# Patient Record
Sex: FEMALE | Race: BLACK OR AFRICAN AMERICAN | Employment: UNEMPLOYED | URBAN - METROPOLITAN AREA
[De-identification: names, ages, dates, MRNs, and addresses within clinical notes are randomized per-mention and may not be internally consistent; named-entity substitution may affect disease eponyms.]

---

## 2019-06-17 ENCOUNTER — HOSPITAL ENCOUNTER (EMERGENCY)
Age: 39
Discharge: HOME | End: 2019-06-17
Attending: EMERGENCY MEDICINE
Payer: COMMERCIAL

## 2019-06-17 VITALS
HEIGHT: 62 IN | OXYGEN SATURATION: 98 % | SYSTOLIC BLOOD PRESSURE: 140 MMHG | WEIGHT: 187.13 LBS | TEMPERATURE: 99.1 F | RESPIRATION RATE: 16 BRPM | BODY MASS INDEX: 34.44 KG/M2 | DIASTOLIC BLOOD PRESSURE: 70 MMHG | HEART RATE: 88 BPM

## 2019-06-17 DIAGNOSIS — L02.219 SUPRAPUBIC ABSCESS: Primary | ICD-10-CM

## 2019-06-17 PROCEDURE — 99282 EMERGENCY DEPT VISIT SF MDM: CPT

## 2019-06-17 PROCEDURE — 6370000000 HC RX 637 (ALT 250 FOR IP): Performed by: EMERGENCY MEDICINE

## 2019-06-17 PROCEDURE — 4500000022 HC ED LEVEL 2 PROCEDURE

## 2019-06-17 RX ORDER — HYDROCODONE BITARTRATE AND ACETAMINOPHEN 5; 325 MG/1; MG/1
1 TABLET ORAL EVERY 6 HOURS PRN
Qty: 6 TABLET | Refills: 0 | Status: SHIPPED | OUTPATIENT
Start: 2019-06-17 | End: 2019-06-20

## 2019-06-17 RX ORDER — CEPHALEXIN 500 MG/1
500 CAPSULE ORAL ONCE
Status: COMPLETED | OUTPATIENT
Start: 2019-06-17 | End: 2019-06-17

## 2019-06-17 RX ORDER — SULFAMETHOXAZOLE AND TRIMETHOPRIM 800; 160 MG/1; MG/1
1 TABLET ORAL ONCE
Status: COMPLETED | OUTPATIENT
Start: 2019-06-17 | End: 2019-06-17

## 2019-06-17 RX ORDER — CEPHALEXIN 500 MG/1
500 CAPSULE ORAL 4 TIMES DAILY
Qty: 28 CAPSULE | Refills: 0 | Status: SHIPPED | OUTPATIENT
Start: 2019-06-17 | End: 2019-06-24

## 2019-06-17 RX ORDER — IBUPROFEN 400 MG/1
800 TABLET ORAL ONCE
Status: COMPLETED | OUTPATIENT
Start: 2019-06-17 | End: 2019-06-17

## 2019-06-17 RX ORDER — NAPROXEN 500 MG/1
500 TABLET ORAL 2 TIMES DAILY PRN
Qty: 20 TABLET | Refills: 0 | Status: SHIPPED | OUTPATIENT
Start: 2019-06-17 | End: 2021-06-10

## 2019-06-17 RX ORDER — SULFAMETHOXAZOLE AND TRIMETHOPRIM 800; 160 MG/1; MG/1
1 TABLET ORAL 2 TIMES DAILY
Qty: 14 TABLET | Refills: 0 | Status: SHIPPED | OUTPATIENT
Start: 2019-06-17 | End: 2019-06-24

## 2019-06-17 RX ADMIN — IBUPROFEN 800 MG: 400 TABLET ORAL at 21:27

## 2019-06-17 RX ADMIN — SULFAMETHOXAZOLE AND TRIMETHOPRIM 1 TABLET: 800; 160 TABLET ORAL at 21:27

## 2019-06-17 RX ADMIN — CEPHALEXIN 500 MG: 500 CAPSULE ORAL at 21:27

## 2019-06-17 ASSESSMENT — PAIN DESCRIPTION - ORIENTATION
ORIENTATION: LOWER;LEFT
ORIENTATION: LEFT

## 2019-06-17 ASSESSMENT — PAIN DESCRIPTION - DESCRIPTORS
DESCRIPTORS: TENDER
DESCRIPTORS: ACHING

## 2019-06-17 ASSESSMENT — PAIN DESCRIPTION - LOCATION
LOCATION: ABDOMEN
LOCATION: ABDOMEN

## 2019-06-17 ASSESSMENT — PAIN DESCRIPTION - FREQUENCY: FREQUENCY: CONTINUOUS

## 2019-06-17 ASSESSMENT — PAIN SCALES - GENERAL
PAINLEVEL_OUTOF10: 10
PAINLEVEL_OUTOF10: 7
PAINLEVEL_OUTOF10: 10

## 2019-06-17 ASSESSMENT — PAIN DESCRIPTION - PAIN TYPE: TYPE: ACUTE PAIN

## 2019-06-18 NOTE — DISCHARGE INSTR - COC
Continuity of Care Form    Patient Name: Jessie Rider   :  1980  MRN:  [id number]    Admit date:  2019  Discharge date:  ***    Code Status Order: No Order   Advance Directives:     Admitting Physician:  No admitting provider for patient encounter. PCP: No primary care provider on file. Discharging Nurse: [facility] Unit/Room#:   Discharging Unit Phone Number: ***    Emergency Contact:   Extended Emergency Contact Information  Primary Emergency Contact: Mika Agarwal [facility] Phone: [phone]  Relation: Parent  Secondary Emergency Contact: [address] [place] [address] Phone: [phone]  Relation: Other    Past Surgical History:  History reviewed. No pertinent surgical history. Immunization History: There is no immunization history on file for this patient. Active Problems: There is no problem list on file for this patient.       Isolation/Infection:   Isolation          No Isolation            Nurse Assessment:  Last Vital Signs: /70   Pulse 104   Temp 99.1 °F (37.3 °C)   Resp 16   Ht 5' 2\" (1.575 m)   Wt 84.9 kg (187 lb 2 oz)   LMP 2019   SpO2 98%   BMI 34.23 kg/m²     Last documented pain score (0-10 scale): Pain Level: 10  Last Weight:   Wt Readings from Last 1 Encounters:   19 84.9 kg (187 lb 2 oz)     Mental Status:  {IP PT MENTAL STATUS:86380}    IV Access:  { VERNELL IV ACCESS:921095118}    Nursing Mobility/ADLs:  Walking   {P DME RMJQ:034778962}  Transfer  {P DME DDJX:282197434}  Bathing  {CHP DME LQFA:343530725}  Dressing  {CHP DME MBPD:203433497}  Toileting  {P DME LEHJ:492444513}  Feeding  {P DME HSFA:828027636}  Med Admin  {P DME PUYE:774279246}  Med Delivery   { VERNELL MED Delivery:721287106}    Wound Care Documentation and Therapy:        Elimination:  Continence:   · Bowel: {YES / EU:29479}  · Bladder: {YES / GT:52778}  Urinary Catheter: {Urinary Catheter:307014024}   Colostomy/Ileostomy/Ileal Conduit: {YES for the continuing treatment of the diagnosis listed and that she requires {Admit to Appropriate Level of Care:45312} for {GREATER/LESS:738208613} 30 days.      Update Admission H&P: {CHP DME Changes in NJIYO:917682266}    PHYSICIAN SIGNATURE:  {Esignature:077727889}

## 2019-06-18 NOTE — ED PROVIDER NOTES
partner violence:     Fear of current or ex partner: Not on file     Emotionally abused: Not on file     Physically abused: Not on file     Forced sexual activity: Not on file   Other Topics Concern    Not on file   Social History Narrative    Not on file     No current facility-administered medications for this encounter. Current Outpatient Medications   Medication Sig Dispense Refill    sulfamethoxazole-trimethoprim (BACTRIM DS) 800-160 MG per tablet Take 1 tablet by mouth 2 times daily for 7 days 14 tablet 0    cephALEXin (KEFLEX) 500 MG capsule Take 1 capsule by mouth 4 times daily for 7 days 28 capsule 0    naproxen (NAPROSYN) 500 MG tablet Take 1 tablet by mouth 2 times daily as needed for Pain 20 tablet 0    HYDROcodone-acetaminophen (NORCO) 5-325 MG per tablet Take 1 tablet by mouth every 6 hours as needed for Pain for up to 3 days. Intended supply: 3 days. Take lowest dose possible to manage pain 6 tablet 0    ibuprofen (ADVIL;MOTRIN) 800 MG tablet Take 1 tablet by mouth every 8 hours as needed for Pain 30 tablet 0     No Known Allergies    REVIEW OF SYSTEMS  10 systems reviewed, pertinent positives per HPI otherwise noted to be negative. PHYSICAL EXAM  BP (!) 140/70   Pulse 88   Temp 99.1 °F (37.3 °C)   Resp 16   Ht 5' 2\" (1.575 m)   Wt 84.9 kg (187 lb 2 oz)   LMP 05/31/2019   SpO2 98%   BMI 34.23 kg/m²   GENERAL APPEARANCE: Awake and alert. Cooperative. No acute distress  HEAD: Normocephalic. Atraumatic. EYES: PERRL. EOM's grossly intact. ENT: Mucous membranes are moist.   NECK: Supple. HEART: RRR. No murmurs  LUNGS: Respirations nonlabored. Lungs are clear to auscultation bilaterally. ABDOMEN: Soft. Non-distended. Tenderness over the skin of the suprapubic region, with erythema surrounding, no streaking redness, isolated to approximately 4 cm x 4 cm area. No guarding or rebound. Normal bowel sounds. EXTREMITIES: No peripheral edema. Moves all extremities equally.  All extremities neurovascularly intact. SKIN: Warm and dry. No acute rashes. Superior suprapubic just left of midline with induration, central fluctuance, and significant tenderness to palpation, with guarding, no open wounds or drainage, patient is obese, had to lift her pannus up to visualize  NEUROLOGICAL: Alert and oriented. No gross facial drooping. Strength 5/5, sensation intact. Normal speech  PSYCHIATRIC: Normal mood and affect. Anxious      PROCEDURES  Patient Name: Raven Sandoval   Medical Record Number: [id number]  Room/Bed: 06/06  Incision and Drainage Procedure Note  Indication: Abscess-suprapubic region just inferior to pannus cleft    Procedure: The patient was positioned appropriately and the skin over the incision site was prepped with chlorhexidine. Local anesthesia was obtained by infiltration using 0.5% Bupivacaine without epinephrine. An incision was then made over the apex of the lesion, over area visualized under ultrasound and approximately 3 cc of foul smelling, purulent and bloody material was expressed. Loculations were broken up using a hemostat but no more material was returned. The drainage cavity was then irrigated, packed with sterile gauze and dressed with nonadherent pad, ABD pad, and tape. The patient tolerated the procedure well. Complications: None    Bedside Ultrasound Soft Tissue Exam:    Indication: Abscess/cellulitis of suprapubic abdominal wall    Cobblestoning: positive   Abscess: positive  Foreign body: negative    Impression: Small approximately 3 cm x 3 cm abscess, with surrounding cellulitic changes of 1 to 2 cm. Images obtained and read by Dr. Samantha Parker      ED COURSE/MDM  Patient seen and evaluated. Old records reviewed. Labs and imaging reviewed and results discussed with patient.    68-year-old female with superficial abscess of suprapubic region, incision and drainage performed with purulent drainage noted, bleeding controlled, packing applied and told to have this removed in 2 days, I explained she needed to keep the area clean and dry, given dose of antibiotics here and prescriptions for home, ibuprofen here for pain, but she requested something stronger for home, I gave her a few Norco along with the Naprosyn, after reviewing her oarrs report, she is afebrile, with no evidence of systemic process, I do not suspect intra-abdominal lesion/infection, otherwise benign abdominal exam, strict return precautions given, will return if any worsening symptoms or new concerns, patient verbalized understanding of plan, felt comfortable going home. Orders Placed This Encounter   Medications    ibuprofen (ADVIL;MOTRIN) tablet 800 mg    cephALEXin (KEFLEX) capsule 500 mg    sulfamethoxazole-trimethoprim (BACTRIM DS;SEPTRA DS) 800-160 MG per tablet 1 tablet    sulfamethoxazole-trimethoprim (BACTRIM DS) 800-160 MG per tablet     Sig: Take 1 tablet by mouth 2 times daily for 7 days     Dispense:  14 tablet     Refill:  0    cephALEXin (KEFLEX) 500 MG capsule     Sig: Take 1 capsule by mouth 4 times daily for 7 days     Dispense:  28 capsule     Refill:  0    naproxen (NAPROSYN) 500 MG tablet     Sig: Take 1 tablet by mouth 2 times daily as needed for Pain     Dispense:  20 tablet     Refill:  0    HYDROcodone-acetaminophen (NORCO) 5-325 MG per tablet     Sig: Take 1 tablet by mouth every 6 hours as needed for Pain for up to 3 days. Intended supply: 3 days. Take lowest dose possible to manage pain     Dispense:  6 tablet     Refill:  0          Patient was given scripts for the following medications. I counseled patient how to take these medications.    Discharge Medication List as of 6/17/2019 10:12 PM      START taking these medications    Details   sulfamethoxazole-trimethoprim (BACTRIM DS) 800-160 MG per tablet Take 1 tablet by mouth 2 times daily for 7 days, Disp-14 tablet, R-0Print      cephALEXin (KEFLEX) 500 MG capsule Take 1 capsule by mouth 4 times daily for 7 days, Disp-28 capsule, R-0Print      naproxen (NAPROSYN) 500 MG tablet Take 1 tablet by mouth 2 times daily as needed for Pain, Disp-20 tablet, R-0Print      HYDROcodone-acetaminophen (NORCO) 5-325 MG per tablet Take 1 tablet by mouth every 6 hours as needed for Pain for up to 3 days. Intended supply: 3 days. Take lowest dose possible to manage pain, Disp-6 tablet, R-0Print             CLINICAL IMPRESSION  1. Suprapubic abscess        Blood pressure (!) 140/70, pulse 88, temperature 99.1 °F (37.3 °C), resp. rate 16, height 5' 2\" (1.575 m), weight 84.9 kg (187 lb 2 oz), last menstrual period 05/31/2019, SpO2 98 %, not currently breastfeeding. DISPOSITION  Martene Bamberger was discharged to home in stable condition.             Karis Desir, DO  06/18/19 0002

## 2019-08-18 ENCOUNTER — HOSPITAL ENCOUNTER (EMERGENCY)
Age: 39
Discharge: HOME | End: 2019-08-18
Attending: EMERGENCY MEDICINE
Payer: COMMERCIAL

## 2019-08-18 VITALS
DIASTOLIC BLOOD PRESSURE: 80 MMHG | RESPIRATION RATE: 17 BRPM | OXYGEN SATURATION: 100 % | HEART RATE: 94 BPM | SYSTOLIC BLOOD PRESSURE: 136 MMHG | TEMPERATURE: 98.3 F

## 2019-08-18 DIAGNOSIS — B07.0 PLANTAR WART: ICD-10-CM

## 2019-08-18 DIAGNOSIS — R10.32 ABDOMINAL PAIN, LEFT LOWER QUADRANT: ICD-10-CM

## 2019-08-18 DIAGNOSIS — N73.0 PID (ACUTE PELVIC INFLAMMATORY DISEASE): Primary | ICD-10-CM

## 2019-08-18 DIAGNOSIS — N89.8 VAGINAL DISCHARGE: ICD-10-CM

## 2019-08-18 LAB
BACTERIA WET PREP: NORMAL
BACTERIA: ABNORMAL /HPF
BILIRUBIN URINE: NEGATIVE
BLOOD, URINE: ABNORMAL
CLARITY: ABNORMAL
CLUE CELLS: NORMAL
COLOR: YELLOW
EPITHELIAL CELLS WET PREP: NORMAL
EPITHELIAL CELLS, UA: ABNORMAL /HPF
GLUCOSE URINE: NEGATIVE MG/DL
HCG(URINE) PREGNANCY TEST: NEGATIVE
KETONES, URINE: NEGATIVE MG/DL
LEUKOCYTE ESTERASE, URINE: NEGATIVE
MICROSCOPIC EXAMINATION: YES
MUCUS: ABNORMAL /LPF
NITRITE, URINE: NEGATIVE
PH UA: 6 (ref 5–8)
PROTEIN UA: ABNORMAL MG/DL
RBC UA: ABNORMAL /HPF (ref 0–2)
RBC WET PREP: NORMAL
SOURCE WET PREP: NORMAL
SPECIFIC GRAVITY UA: >=1.03 (ref 1–1.03)
TRICHOMONAS PREP: NORMAL
URINE TYPE: ABNORMAL
UROBILINOGEN, URINE: 1 E.U./DL
WBC UA: ABNORMAL /HPF (ref 0–5)
WBC WET PREP: NORMAL
YEAST WET PREP: NORMAL

## 2019-08-18 PROCEDURE — 81001 URINALYSIS AUTO W/SCOPE: CPT

## 2019-08-18 PROCEDURE — 87210 SMEAR WET MOUNT SALINE/INK: CPT

## 2019-08-18 PROCEDURE — 96372 THER/PROPH/DIAG INJ SC/IM: CPT

## 2019-08-18 PROCEDURE — 99283 EMERGENCY DEPT VISIT LOW MDM: CPT

## 2019-08-18 PROCEDURE — 87491 CHLMYD TRACH DNA AMP PROBE: CPT

## 2019-08-18 PROCEDURE — 87591 N.GONORRHOEAE DNA AMP PROB: CPT

## 2019-08-18 PROCEDURE — 6360000002 HC RX W HCPCS: Performed by: EMERGENCY MEDICINE

## 2019-08-18 PROCEDURE — 84703 CHORIONIC GONADOTROPIN ASSAY: CPT

## 2019-08-18 PROCEDURE — 6370000000 HC RX 637 (ALT 250 FOR IP): Performed by: EMERGENCY MEDICINE

## 2019-08-18 RX ORDER — IBUPROFEN 600 MG/1
600 TABLET ORAL EVERY 6 HOURS PRN
Qty: 30 TABLET | Refills: 0 | Status: SHIPPED | OUTPATIENT
Start: 2019-08-18 | End: 2021-06-10

## 2019-08-18 RX ORDER — DOXYCYCLINE 100 MG/1
100 CAPSULE ORAL ONCE
Status: COMPLETED | OUTPATIENT
Start: 2019-08-18 | End: 2019-08-18

## 2019-08-18 RX ORDER — DOXYCYCLINE 100 MG/1
100 TABLET ORAL 2 TIMES DAILY
Qty: 28 TABLET | Refills: 0 | Status: SHIPPED | OUTPATIENT
Start: 2019-08-18 | End: 2019-09-01

## 2019-08-18 RX ORDER — CEFTRIAXONE 1 G/1
1 INJECTION, POWDER, FOR SOLUTION INTRAMUSCULAR; INTRAVENOUS ONCE
Status: COMPLETED | OUTPATIENT
Start: 2019-08-18 | End: 2019-08-18

## 2019-08-18 RX ORDER — CRYOTHERAPY WART REMOVE DEVICE
EACH TOPICAL
Qty: 1 KIT | Refills: 0 | Status: SHIPPED | OUTPATIENT
Start: 2019-08-18 | End: 2021-06-10

## 2019-08-18 RX ADMIN — CEFTRIAXONE 1 G: 1 INJECTION, POWDER, FOR SOLUTION INTRAMUSCULAR; INTRAVENOUS at 20:24

## 2019-08-18 RX ADMIN — DOXYCYCLINE 100 MG: 100 CAPSULE ORAL at 20:24

## 2019-08-18 ASSESSMENT — PAIN DESCRIPTION - LOCATION: LOCATION: ABDOMEN

## 2019-08-18 ASSESSMENT — PAIN DESCRIPTION - ORIENTATION: ORIENTATION: LEFT

## 2019-08-18 ASSESSMENT — PAIN SCALES - GENERAL: PAINLEVEL_OUTOF10: 7

## 2019-08-18 ASSESSMENT — PAIN DESCRIPTION - PAIN TYPE: TYPE: ACUTE PAIN

## 2019-08-21 LAB
C TRACH DNA GENITAL QL NAA+PROBE: NEGATIVE
N. GONORRHOEAE DNA: NEGATIVE

## 2019-10-13 ENCOUNTER — HOSPITAL ENCOUNTER (EMERGENCY)
Age: 39
Discharge: HOME | End: 2019-10-13
Attending: EMERGENCY MEDICINE
Payer: COMMERCIAL

## 2019-10-13 VITALS
RESPIRATION RATE: 16 BRPM | DIASTOLIC BLOOD PRESSURE: 65 MMHG | HEIGHT: 62 IN | BODY MASS INDEX: 34.8 KG/M2 | TEMPERATURE: 97.9 F | WEIGHT: 189.13 LBS | HEART RATE: 78 BPM | SYSTOLIC BLOOD PRESSURE: 112 MMHG | OXYGEN SATURATION: 99 %

## 2019-10-13 DIAGNOSIS — T25.221A PARTIAL THICKNESS BURN OF RIGHT FOOT, INITIAL ENCOUNTER: Primary | ICD-10-CM

## 2019-10-13 PROCEDURE — 90715 TDAP VACCINE 7 YRS/> IM: CPT | Performed by: EMERGENCY MEDICINE

## 2019-10-13 PROCEDURE — 4500000022 HC ED LEVEL 2 PROCEDURE

## 2019-10-13 PROCEDURE — 6360000002 HC RX W HCPCS: Performed by: EMERGENCY MEDICINE

## 2019-10-13 PROCEDURE — 99282 EMERGENCY DEPT VISIT SF MDM: CPT

## 2019-10-13 PROCEDURE — 90471 IMMUNIZATION ADMIN: CPT | Performed by: EMERGENCY MEDICINE

## 2019-10-13 PROCEDURE — 6370000000 HC RX 637 (ALT 250 FOR IP): Performed by: EMERGENCY MEDICINE

## 2019-10-13 RX ORDER — NAPROXEN 500 MG/1
500 TABLET ORAL 2 TIMES DAILY WITH MEALS
Qty: 30 TABLET | Refills: 0 | Status: SHIPPED | OUTPATIENT
Start: 2019-10-13 | End: 2021-06-10

## 2019-10-13 RX ORDER — NAPROXEN 500 MG/1
500 TABLET ORAL ONCE
Status: COMPLETED | OUTPATIENT
Start: 2019-10-13 | End: 2019-10-13

## 2019-10-13 RX ADMIN — Medication 500 MG: at 22:35

## 2019-10-13 RX ADMIN — TETANUS TOXOID, REDUCED DIPHTHERIA TOXOID AND ACELLULAR PERTUSSIS VACCINE, ADSORBED 0.5 ML: 5; 2.5; 8; 8; 2.5 SUSPENSION INTRAMUSCULAR at 22:54

## 2019-10-13 ASSESSMENT — PAIN DESCRIPTION - LOCATION: LOCATION: FOOT

## 2019-10-13 ASSESSMENT — PAIN SCALES - GENERAL: PAINLEVEL_OUTOF10: 10

## 2019-10-13 ASSESSMENT — PAIN DESCRIPTION - PAIN TYPE: TYPE: ACUTE PAIN

## 2019-10-13 ASSESSMENT — PAIN DESCRIPTION - DESCRIPTORS: DESCRIPTORS: BURNING

## 2019-10-13 ASSESSMENT — PAIN DESCRIPTION - ORIENTATION: ORIENTATION: RIGHT

## 2019-10-14 ASSESSMENT — ENCOUNTER SYMPTOMS: COLOR CHANGE: 1

## 2020-02-05 ENCOUNTER — HOSPITAL ENCOUNTER (EMERGENCY)
Age: 40
Discharge: HOME | End: 2020-02-05
Attending: EMERGENCY MEDICINE
Payer: COMMERCIAL

## 2020-02-05 VITALS
RESPIRATION RATE: 16 BRPM | OXYGEN SATURATION: 100 % | HEART RATE: 103 BPM | BODY MASS INDEX: 31.89 KG/M2 | HEIGHT: 63 IN | SYSTOLIC BLOOD PRESSURE: 126 MMHG | DIASTOLIC BLOOD PRESSURE: 85 MMHG | TEMPERATURE: 98.3 F | WEIGHT: 180 LBS

## 2020-02-05 PROCEDURE — 96372 THER/PROPH/DIAG INJ SC/IM: CPT

## 2020-02-05 PROCEDURE — 99282 EMERGENCY DEPT VISIT SF MDM: CPT

## 2020-02-05 PROCEDURE — 6360000002 HC RX W HCPCS: Performed by: EMERGENCY MEDICINE

## 2020-02-05 PROCEDURE — 6370000000 HC RX 637 (ALT 250 FOR IP): Performed by: EMERGENCY MEDICINE

## 2020-02-05 RX ORDER — AZITHROMYCIN 250 MG/1
1000 TABLET, FILM COATED ORAL ONCE
Status: COMPLETED | OUTPATIENT
Start: 2020-02-05 | End: 2020-02-05

## 2020-02-05 RX ORDER — CEFTRIAXONE SODIUM 250 MG/1
250 INJECTION, POWDER, FOR SOLUTION INTRAMUSCULAR; INTRAVENOUS ONCE
Status: COMPLETED | OUTPATIENT
Start: 2020-02-05 | End: 2020-02-05

## 2020-02-05 RX ADMIN — CEFTRIAXONE SODIUM 250 MG: 250 INJECTION, POWDER, FOR SOLUTION INTRAMUSCULAR; INTRAVENOUS at 06:16

## 2020-02-05 RX ADMIN — AZITHROMYCIN MONOHYDRATE 1000 MG: 250 TABLET ORAL at 06:16

## 2020-02-05 ASSESSMENT — PAIN DESCRIPTION - DESCRIPTORS: DESCRIPTORS: SHARP

## 2020-02-05 ASSESSMENT — PAIN SCALES - GENERAL
PAINLEVEL_OUTOF10: 5
PAINLEVEL_OUTOF10: 5

## 2020-02-05 ASSESSMENT — PAIN DESCRIPTION - FREQUENCY: FREQUENCY: CONTINUOUS

## 2020-02-05 ASSESSMENT — PAIN DESCRIPTION - ORIENTATION: ORIENTATION: LOWER

## 2020-02-05 ASSESSMENT — PAIN DESCRIPTION - LOCATION: LOCATION: ABDOMEN

## 2020-02-05 ASSESSMENT — PAIN DESCRIPTION - PROGRESSION: CLINICAL_PROGRESSION: GRADUALLY IMPROVING

## 2020-02-05 ASSESSMENT — PAIN DESCRIPTION - PAIN TYPE: TYPE: ACUTE PAIN

## 2020-02-05 ASSESSMENT — PAIN DESCRIPTION - ONSET: ONSET: SUDDEN

## 2020-02-05 NOTE — ED TRIAGE NOTES
States she had GC and chlamydia a month ago and was treated but did not wait the 7 days after treatment to have sex. No has abdominal pain and discharge and wants to be treated again. States she does not to repeat testing. Education regarding repeated STD's and not using condoms when not in a stable relationship.

## 2020-02-05 NOTE — ED PROVIDER NOTES
[address] [facility]  eMERGENCY dEPARTMENT eNCOUnter      Pt Name: Simon Mitchell  MRN: [id number]  Richietrongffreida 1980  Date of evaluation: 2/5/2020  Provider: Barrie Patel MD  PCP: No primary care provider on file. CHIEF COMPLAINT       Chief Complaint   Patient presents with    Exposure to STD       HISTORY OFPRESENT ILLNESS   (Location/Symptom, Timing/Onset, Context/Setting, Quality, Duration, Modifying Factors,Severity)  Note limiting factors. Simon Mitchell is a 42 y.o. female is concerned because she got treated for gonorrhea and chlamydia and her symptoms are back she does not want to get retested she just wants to be treated again she rates her pain at a 10 out of 10 is not associate with any vomiting or diarrhea she does have mild discharge    Nursing Notes were all reviewed and agreed with or any disagreements were addressed  in the HPI. REVIEW OF SYSTEMS    (2-9 systems for level 4, 10 or more for level 5)     Review of Systems    Positives and Pertinent negatives as per HPI. Except as noted above in the ROS, all other systems were reviewed andnegative. PASTMEDICAL HISTORY   History reviewed. No pertinent past medical history. SURGICAL HISTORY     History reviewed. No pertinent surgical history. CURRENT MEDICATIONS       Previous Medications    IBUPROFEN (ADVIL;MOTRIN) 600 MG TABLET    Take 1 tablet by mouth every 6 hours as needed for Pain    NAPROXEN (NAPROSYN) 500 MG TABLET    Take 1 tablet by mouth 2 times daily as needed for Pain    NAPROXEN (NAPROSYN) 500 MG TABLET    Take 1 tablet by mouth 2 times daily (with meals)    PROPANE-DIMETHYL ETHER (COMPOUND W FREEZE OFF PLANTAR) KIT    Use as directed on package to remove lesion/wart on your foot       ALLERGIES     Patient has no known allergies. FAMILY HISTORY     No family history on file.        SOCIAL HISTORY       Social History     Socioeconomic History    Marital status: Single rhythm, S1 and S2 normal, no murmur, rub or gallop. Abdomen:   Soft, non-tender, bowel sounds active,   no masses, no organomegaly. Extremities: No edema, cords or calf tenderness. Full range of motion. Pulses: 2+ and symmetric   Skin: Turgor is normal, no rashes or lesions. Neurologic: Alert and oriented X 3. No focal findings. Motor grossly normal.  Speech clear, no drift, CN III-XII grossly intact,        DIAGNOSTIC RESULTS   LABS:    Labs Reviewed - No data to display    All other labs were within normal range or not returned as of this dictation. EKG: All EKG's are interpreted by the Emergency Department Physician who eithersigns or Co-signs this chart in the absence of a cardiologist.        RADIOLOGY:   Non-plain film images such as CT, Ultrasound and MRI are read by the radiologist. Plain radiographic images are visualized by myself. *    Interpretation per the Radiologist below, if available at the time of this note:    No orders to display         PROCEDURES   Unless otherwise noted below, none     Procedures    *    CRITICAL CARE TIME   N/A      EMERGENCY DEPARTMENT COURSE and DIFFERENTIALDIAGNOSIS/MDM:   Vitals:    Vitals:    02/05/20 0554   BP: 126/85   Pulse: 103   Resp: 16   Temp: 98.3 °F (36.8 °C)   TempSrc: Oral   SpO2: 100%   Weight: 81.6 kg (180 lb)   Height: 5' 3\" (1.6 m)       Patient was given thefollowing medications:  Medications   cefTRIAXone (ROCEPHIN) injection 250 mg (has no administration in time range)   azithromycin (ZITHROMAX) tablet 1,000 mg (has no administration in time range)           The patient tolerated their visit well. The patient and / or the familywere informed of the results of any tests, a time was given to answer questions. FINAL IMPRESSION      1.  Cervicitis and endocervicitis          DISPOSITION/PLAN   DISPOSITION        PATIENT REFERRED TO:  *[facility]    In 1 week        DISCHARGE MEDICATIONS:  New Prescriptions    No medications

## 2020-03-30 ENCOUNTER — HOSPITAL ENCOUNTER (EMERGENCY)
Age: 40
Discharge: HOME | End: 2020-03-30
Attending: EMERGENCY MEDICINE
Payer: COMMERCIAL

## 2020-03-30 VITALS
SYSTOLIC BLOOD PRESSURE: 131 MMHG | WEIGHT: 179.7 LBS | RESPIRATION RATE: 15 BRPM | BODY MASS INDEX: 31.83 KG/M2 | TEMPERATURE: 98.7 F | DIASTOLIC BLOOD PRESSURE: 71 MMHG | OXYGEN SATURATION: 99 % | HEART RATE: 96 BPM

## 2020-03-30 LAB
AMORPHOUS: ABNORMAL /HPF
BACTERIA WET PREP: ABNORMAL
BACTERIA: ABNORMAL /HPF
BILIRUBIN URINE: NEGATIVE
BLOOD, URINE: NEGATIVE
CLARITY: CLEAR
CLUE CELLS: ABNORMAL
COLOR: YELLOW
EPITHELIAL CELLS WET PREP: ABNORMAL
EPITHELIAL CELLS, UA: ABNORMAL /HPF (ref 0–5)
GLUCOSE URINE: NEGATIVE MG/DL
HCG(URINE) PREGNANCY TEST: NEGATIVE
KETONES, URINE: NEGATIVE MG/DL
LEUKOCYTE ESTERASE, URINE: ABNORMAL
MICROSCOPIC EXAMINATION: YES
MUCUS: ABNORMAL /LPF
NITRITE, URINE: NEGATIVE
PH UA: 7 (ref 5–8)
PROTEIN UA: NEGATIVE MG/DL
RBC UA: ABNORMAL /HPF (ref 0–4)
RBC WET PREP: ABNORMAL
SOURCE WET PREP: ABNORMAL
SPECIFIC GRAVITY UA: 1.02 (ref 1–1.03)
TRICHOMONAS PREP: ABNORMAL
URINE TYPE: ABNORMAL
UROBILINOGEN, URINE: 0.2 E.U./DL
WBC UA: ABNORMAL /HPF (ref 0–5)
WBC WET PREP: ABNORMAL
YEAST WET PREP: ABNORMAL

## 2020-03-30 PROCEDURE — 87210 SMEAR WET MOUNT SALINE/INK: CPT

## 2020-03-30 PROCEDURE — 87591 N.GONORRHOEAE DNA AMP PROB: CPT

## 2020-03-30 PROCEDURE — 6370000000 HC RX 637 (ALT 250 FOR IP): Performed by: EMERGENCY MEDICINE

## 2020-03-30 PROCEDURE — 96372 THER/PROPH/DIAG INJ SC/IM: CPT

## 2020-03-30 PROCEDURE — 87491 CHLMYD TRACH DNA AMP PROBE: CPT

## 2020-03-30 PROCEDURE — 84703 CHORIONIC GONADOTROPIN ASSAY: CPT

## 2020-03-30 PROCEDURE — 6360000002 HC RX W HCPCS: Performed by: EMERGENCY MEDICINE

## 2020-03-30 PROCEDURE — 99283 EMERGENCY DEPT VISIT LOW MDM: CPT

## 2020-03-30 PROCEDURE — 81001 URINALYSIS AUTO W/SCOPE: CPT

## 2020-03-30 RX ORDER — METRONIDAZOLE 500 MG/1
500 TABLET ORAL 2 TIMES DAILY
Qty: 28 TABLET | Refills: 0 | Status: SHIPPED | OUTPATIENT
Start: 2020-03-30 | End: 2020-04-13

## 2020-03-30 RX ORDER — DOXYCYCLINE 100 MG/1
100 TABLET ORAL 2 TIMES DAILY
Qty: 28 TABLET | Refills: 0 | Status: SHIPPED | OUTPATIENT
Start: 2020-03-30 | End: 2020-04-13

## 2020-03-30 RX ORDER — METRONIDAZOLE 500 MG/1
500 TABLET ORAL ONCE
Status: COMPLETED | OUTPATIENT
Start: 2020-03-30 | End: 2020-03-30

## 2020-03-30 RX ORDER — DOXYCYCLINE 100 MG/1
100 CAPSULE ORAL ONCE
Status: COMPLETED | OUTPATIENT
Start: 2020-03-30 | End: 2020-03-30

## 2020-03-30 RX ORDER — CEFTRIAXONE SODIUM 250 MG/1
250 INJECTION, POWDER, FOR SOLUTION INTRAMUSCULAR; INTRAVENOUS ONCE
Status: COMPLETED | OUTPATIENT
Start: 2020-03-30 | End: 2020-03-30

## 2020-03-30 RX ADMIN — METRONIDAZOLE 500 MG: 500 TABLET ORAL at 14:17

## 2020-03-30 RX ADMIN — CEFTRIAXONE SODIUM 250 MG: 250 INJECTION, POWDER, FOR SOLUTION INTRAMUSCULAR; INTRAVENOUS at 14:17

## 2020-03-30 RX ADMIN — DOXYCYCLINE 100 MG: 100 CAPSULE ORAL at 14:17

## 2020-03-30 ASSESSMENT — PAIN DESCRIPTION - ORIENTATION: ORIENTATION: LOWER

## 2020-03-30 ASSESSMENT — PAIN DESCRIPTION - PAIN TYPE: TYPE: ACUTE PAIN

## 2020-03-30 ASSESSMENT — PAIN SCALES - GENERAL: PAINLEVEL_OUTOF10: 10

## 2020-03-30 ASSESSMENT — PAIN DESCRIPTION - LOCATION: LOCATION: ABDOMEN

## 2020-03-30 NOTE — ED PROVIDER NOTES
EMERGENCY DEPARTMENT PHYSICIAN DOCUMENTATION      CHIEF COMPLAINT  Vaginal Discharge  Patient information was obtained from patient. History/Exam limitations: none. HISTORY OF PRESENT ILLNESS  Rodolfo Davis is a 42 y.o. female with complaint of Vaginal discharge  Onset of discharge 2 weeks ago. Described as abnormal discharge, smells different  No urinary symptoms No radiation  Worse with skin rubbing and walking  No fevers or chills. +lower abdominal pain        REVIEW OF SYSTEMS  A full 10 point Review of Systems was performed and is negative aside from pertinent positives mentioned in HPI    ALLERGIES:  No Known Allergies    PAST HISTORY  History reviewed. No pertinent past medical history. History reviewed. No pertinent family history. No current facility-administered medications on file prior to encounter.       Current Outpatient Medications on File Prior to Encounter   Medication Sig Dispense Refill    naproxen (NAPROSYN) 500 MG tablet Take 1 tablet by mouth 2 times daily (with meals) 30 tablet 0    ibuprofen (ADVIL;MOTRIN) 600 MG tablet Take 1 tablet by mouth every 6 hours as needed for Pain 30 tablet 0    Propane-Dimethyl Ether (COMPOUND W FREEZE OFF PLANTAR) KIT Use as directed on package to remove lesion/wart on your foot 1 kit 0    naproxen (NAPROSYN) 500 MG tablet Take 1 tablet by mouth 2 times daily as needed for Pain 20 tablet 0       Social History     Tobacco Use    Smoking status: Current Every Day Smoker     Packs/day: 1.00     Types: Cigarettes    Smokeless tobacco: Never Used   Substance Use Topics    Alcohol use: Yes     Comment: occas    Drug use: No         EXAM:   Presentation Vital Signs: /71   Pulse 96   Temp 98.7 °F (37.1 °C) (Oral)   Resp 15   Wt 81.5 kg (179 lb 11.2 oz)   LMP 03/09/2020   SpO2 99%   BMI 31.83 kg/m²   General: Well nourished, no acute distress  Head: No traumatic injury  ENT: MMM, no facial asymmetry, no nasal discharge  Eyes:

## 2020-03-31 LAB
C TRACH DNA GENITAL QL NAA+PROBE: NEGATIVE
N. GONORRHOEAE DNA: NEGATIVE

## 2020-04-28 ENCOUNTER — HOSPITAL ENCOUNTER (EMERGENCY)
Age: 40
Discharge: HOME | End: 2020-04-28
Payer: COMMERCIAL

## 2020-04-28 VITALS
TEMPERATURE: 99.1 F | WEIGHT: 170.6 LBS | BODY MASS INDEX: 30.22 KG/M2 | RESPIRATION RATE: 16 BRPM | DIASTOLIC BLOOD PRESSURE: 92 MMHG | HEART RATE: 92 BPM | OXYGEN SATURATION: 100 % | SYSTOLIC BLOOD PRESSURE: 169 MMHG

## 2020-04-28 LAB
BACTERIA WET PREP: ABNORMAL
BACTERIA: ABNORMAL /HPF
BILIRUBIN URINE: NEGATIVE
BLOOD, URINE: NEGATIVE
CLARITY: CLEAR
CLUE CELLS: ABNORMAL
COLOR: YELLOW
EPITHELIAL CELLS WET PREP: ABNORMAL
EPITHELIAL CELLS, UA: ABNORMAL /HPF (ref 0–5)
GLUCOSE URINE: NEGATIVE MG/DL
HCG(URINE) PREGNANCY TEST: NEGATIVE
KETONES, URINE: ABNORMAL MG/DL
LEUKOCYTE ESTERASE, URINE: ABNORMAL
MICROSCOPIC EXAMINATION: YES
MUCUS: ABNORMAL /LPF
NITRITE, URINE: NEGATIVE
PH UA: 6 (ref 5–8)
PROTEIN UA: NEGATIVE MG/DL
RBC UA: ABNORMAL /HPF (ref 0–4)
RBC WET PREP: ABNORMAL
SOURCE WET PREP: ABNORMAL
SPECIFIC GRAVITY UA: 1.02 (ref 1–1.03)
TRICHOMONAS PREP: ABNORMAL
URINE REFLEX TO CULTURE: YES
URINE TYPE: ABNORMAL
UROBILINOGEN, URINE: 0.2 E.U./DL
WBC UA: ABNORMAL /HPF (ref 0–5)
WBC WET PREP: ABNORMAL
YEAST WET PREP: ABNORMAL

## 2020-04-28 PROCEDURE — 87210 SMEAR WET MOUNT SALINE/INK: CPT

## 2020-04-28 PROCEDURE — 84703 CHORIONIC GONADOTROPIN ASSAY: CPT

## 2020-04-28 PROCEDURE — 87086 URINE CULTURE/COLONY COUNT: CPT

## 2020-04-28 PROCEDURE — 87591 N.GONORRHOEAE DNA AMP PROB: CPT

## 2020-04-28 PROCEDURE — 99283 EMERGENCY DEPT VISIT LOW MDM: CPT

## 2020-04-28 PROCEDURE — 87491 CHLMYD TRACH DNA AMP PROBE: CPT

## 2020-04-28 PROCEDURE — 6370000000 HC RX 637 (ALT 250 FOR IP): Performed by: PHYSICIAN ASSISTANT

## 2020-04-28 PROCEDURE — 81001 URINALYSIS AUTO W/SCOPE: CPT

## 2020-04-28 RX ORDER — FLUCONAZOLE 150 MG/1
150 TABLET ORAL ONCE
Status: COMPLETED | OUTPATIENT
Start: 2020-04-28 | End: 2020-04-28

## 2020-04-28 RX ORDER — FLUCONAZOLE 150 MG/1
150 TABLET ORAL ONCE
Qty: 1 TABLET | Refills: 0 | Status: SHIPPED | OUTPATIENT
Start: 2020-05-01 | End: 2020-05-01

## 2020-04-28 RX ORDER — METRONIDAZOLE 7.5 MG/G
1 GEL VAGINAL DAILY
Qty: 1 TUBE | Refills: 0 | Status: SHIPPED | OUTPATIENT
Start: 2020-04-28 | End: 2020-05-03

## 2020-04-28 RX ADMIN — FLUCONAZOLE 150 MG: 150 TABLET ORAL at 18:27

## 2020-04-28 ASSESSMENT — PAIN DESCRIPTION - LOCATION: LOCATION: ABDOMEN

## 2020-04-28 ASSESSMENT — PAIN DESCRIPTION - PAIN TYPE: TYPE: ACUTE PAIN

## 2020-04-28 ASSESSMENT — PAIN DESCRIPTION - DESCRIPTORS: DESCRIPTORS: CRAMPING

## 2020-04-28 ASSESSMENT — PAIN SCALES - GENERAL: PAINLEVEL_OUTOF10: 4

## 2020-04-28 NOTE — ED NOTES
Pt states understanding of d/c instructions. Pt alert and oriented with steady gait upon discharge.       Anant Márquez RN  04/28/20 0189

## 2020-04-28 NOTE — ED TRIAGE NOTES
Pt c/o vaginal itching and burning. Mild lower abdominal cramping. Seen in ED last and treated for STDs. Patient states she missed some pills of her Doxycycline.

## 2020-04-29 LAB
C TRACH DNA GENITAL QL NAA+PROBE: NEGATIVE
N. GONORRHOEAE DNA: NEGATIVE
URINE CULTURE, ROUTINE: NORMAL

## 2021-06-10 ENCOUNTER — APPOINTMENT (OUTPATIENT)
Dept: GENERAL RADIOLOGY | Age: 41
End: 2021-06-10
Payer: COMMERCIAL

## 2021-06-10 ENCOUNTER — HOSPITAL ENCOUNTER (EMERGENCY)
Age: 41
Discharge: HOME | End: 2021-06-10
Attending: EMERGENCY MEDICINE
Payer: COMMERCIAL

## 2021-06-10 VITALS
RESPIRATION RATE: 16 BRPM | SYSTOLIC BLOOD PRESSURE: 144 MMHG | HEIGHT: 62 IN | TEMPERATURE: 98.9 F | WEIGHT: 169.75 LBS | DIASTOLIC BLOOD PRESSURE: 99 MMHG | OXYGEN SATURATION: 100 % | HEART RATE: 100 BPM | BODY MASS INDEX: 31.24 KG/M2

## 2021-06-10 DIAGNOSIS — R07.9 CHEST PAIN, UNSPECIFIED TYPE: Primary | ICD-10-CM

## 2021-06-10 LAB
A/G RATIO: 1.1 (ref 1.1–2.2)
ALBUMIN SERPL-MCNC: 4.4 G/DL (ref 3.4–5)
ALP BLD-CCNC: 78 U/L (ref 40–129)
ALT SERPL-CCNC: 13 U/L (ref 10–40)
ANION GAP SERPL CALCULATED.3IONS-SCNC: 12 MMOL/L (ref 3–16)
AST SERPL-CCNC: 18 U/L (ref 15–37)
BASOPHILS ABSOLUTE: 0 K/UL (ref 0–0.2)
BASOPHILS RELATIVE PERCENT: 0.4 %
BILIRUB SERPL-MCNC: 0.4 MG/DL (ref 0–1)
BUN BLDV-MCNC: 10 MG/DL (ref 7–20)
CALCIUM SERPL-MCNC: 9.5 MG/DL (ref 8.3–10.6)
CHLORIDE BLD-SCNC: 101 MMOL/L (ref 99–110)
CO2: 24 MMOL/L (ref 21–32)
CREAT SERPL-MCNC: 0.8 MG/DL (ref 0.6–1.1)
D DIMER: 213 NG/ML DDU (ref 0–229)
EOSINOPHILS ABSOLUTE: 0 K/UL (ref 0–0.6)
EOSINOPHILS RELATIVE PERCENT: 0 %
GFR AFRICAN AMERICAN: >60
GFR NON-AFRICAN AMERICAN: >60
GLOBULIN: 3.9 G/DL
GLUCOSE BLD-MCNC: 104 MG/DL (ref 70–99)
HCG QUALITATIVE: NEGATIVE
HCT VFR BLD CALC: 40.8 % (ref 36–48)
HEMOGLOBIN: 13.9 G/DL (ref 12–16)
LYMPHOCYTES ABSOLUTE: 1.7 K/UL (ref 1–5.1)
LYMPHOCYTES RELATIVE PERCENT: 21.3 %
MCH RBC QN AUTO: 29.4 PG (ref 26–34)
MCHC RBC AUTO-ENTMCNC: 34 G/DL (ref 31–36)
MCV RBC AUTO: 86.5 FL (ref 80–100)
MONOCYTES ABSOLUTE: 0.6 K/UL (ref 0–1.3)
MONOCYTES RELATIVE PERCENT: 7.2 %
NEUTROPHILS ABSOLUTE: 5.7 K/UL (ref 1.7–7.7)
NEUTROPHILS RELATIVE PERCENT: 71.1 %
PDW BLD-RTO: 13.8 % (ref 12.4–15.4)
PLATELET # BLD: 308 K/UL (ref 135–450)
PMV BLD AUTO: 8.2 FL (ref 5–10.5)
POTASSIUM SERPL-SCNC: 3.4 MMOL/L (ref 3.5–5.1)
RBC # BLD: 4.72 M/UL (ref 4–5.2)
REASON FOR REJECTION: NORMAL
REJECTED TEST: NORMAL
SARS-COV-2, NAAT: NOT DETECTED
SODIUM BLD-SCNC: 137 MMOL/L (ref 136–145)
TOTAL PROTEIN: 8.3 G/DL (ref 6.4–8.2)
TROPONIN: <0.01 NG/ML
TROPONIN: <0.01 NG/ML
WBC # BLD: 8 K/UL (ref 4–11)

## 2021-06-10 PROCEDURE — 85025 COMPLETE CBC W/AUTO DIFF WBC: CPT

## 2021-06-10 PROCEDURE — 84484 ASSAY OF TROPONIN QUANT: CPT

## 2021-06-10 PROCEDURE — 84703 CHORIONIC GONADOTROPIN ASSAY: CPT

## 2021-06-10 PROCEDURE — 96374 THER/PROPH/DIAG INJ IV PUSH: CPT

## 2021-06-10 PROCEDURE — 99285 EMERGENCY DEPT VISIT HI MDM: CPT

## 2021-06-10 PROCEDURE — 2580000003 HC RX 258: Performed by: EMERGENCY MEDICINE

## 2021-06-10 PROCEDURE — 85379 FIBRIN DEGRADATION QUANT: CPT

## 2021-06-10 PROCEDURE — 93005 ELECTROCARDIOGRAM TRACING: CPT | Performed by: EMERGENCY MEDICINE

## 2021-06-10 PROCEDURE — 36415 COLL VENOUS BLD VENIPUNCTURE: CPT

## 2021-06-10 PROCEDURE — 80053 COMPREHEN METABOLIC PANEL: CPT

## 2021-06-10 PROCEDURE — 6370000000 HC RX 637 (ALT 250 FOR IP): Performed by: EMERGENCY MEDICINE

## 2021-06-10 PROCEDURE — 6360000002 HC RX W HCPCS: Performed by: EMERGENCY MEDICINE

## 2021-06-10 PROCEDURE — 87635 SARS-COV-2 COVID-19 AMP PRB: CPT

## 2021-06-10 PROCEDURE — 71045 X-RAY EXAM CHEST 1 VIEW: CPT

## 2021-06-10 RX ORDER — 0.9 % SODIUM CHLORIDE 0.9 %
1000 INTRAVENOUS SOLUTION INTRAVENOUS ONCE
Status: COMPLETED | OUTPATIENT
Start: 2021-06-10 | End: 2021-06-10

## 2021-06-10 RX ORDER — 0.9 % SODIUM CHLORIDE 0.9 %
1000 INTRAVENOUS SOLUTION INTRAVENOUS ONCE
Status: DISCONTINUED | OUTPATIENT
Start: 2021-06-10 | End: 2021-06-10 | Stop reason: HOSPADM

## 2021-06-10 RX ORDER — LORAZEPAM 1 MG/1
1 TABLET ORAL ONCE
Status: COMPLETED | OUTPATIENT
Start: 2021-06-10 | End: 2021-06-10

## 2021-06-10 RX ORDER — ASPIRIN 81 MG/1
324 TABLET, CHEWABLE ORAL ONCE
Status: COMPLETED | OUTPATIENT
Start: 2021-06-10 | End: 2021-06-10

## 2021-06-10 RX ORDER — LORAZEPAM 2 MG/ML
1 INJECTION INTRAMUSCULAR ONCE
Status: COMPLETED | OUTPATIENT
Start: 2021-06-10 | End: 2021-06-10

## 2021-06-10 RX ORDER — LORAZEPAM 1 MG/1
1 TABLET ORAL ONCE
Status: DISCONTINUED | OUTPATIENT
Start: 2021-06-10 | End: 2021-06-10

## 2021-06-10 RX ADMIN — LORAZEPAM 1 MG: 2 INJECTION INTRAMUSCULAR; INTRAVENOUS at 19:20

## 2021-06-10 RX ADMIN — LORAZEPAM 1 MG: 1 TABLET ORAL at 16:09

## 2021-06-10 RX ADMIN — SODIUM CHLORIDE 1000 ML: 9 INJECTION, SOLUTION INTRAVENOUS at 17:32

## 2021-06-10 RX ADMIN — SODIUM CHLORIDE 1000 ML: 9 INJECTION, SOLUTION INTRAVENOUS at 19:19

## 2021-06-10 RX ADMIN — ASPIRIN 324 MG: 81 TABLET, CHEWABLE ORAL at 16:09

## 2021-06-10 ASSESSMENT — PAIN SCALES - GENERAL
PAINLEVEL_OUTOF10: 8
PAINLEVEL_OUTOF10: 0
PAINLEVEL_OUTOF10: 7

## 2021-06-10 ASSESSMENT — PAIN DESCRIPTION - LOCATION
LOCATION: BACK
LOCATION: FLANK

## 2021-06-10 ASSESSMENT — PAIN DESCRIPTION - ONSET: ONSET: ON-GOING

## 2021-06-10 ASSESSMENT — PAIN DESCRIPTION - ORIENTATION: ORIENTATION: LEFT

## 2021-06-10 ASSESSMENT — PAIN DESCRIPTION - PROGRESSION: CLINICAL_PROGRESSION: NOT CHANGED

## 2021-06-10 ASSESSMENT — PAIN DESCRIPTION - DESCRIPTORS
DESCRIPTORS: DISCOMFORT
DESCRIPTORS: ACHING

## 2021-06-10 ASSESSMENT — PAIN DESCRIPTION - PAIN TYPE
TYPE: CHRONIC PAIN
TYPE: CHRONIC PAIN

## 2021-06-10 ASSESSMENT — PAIN DESCRIPTION - FREQUENCY: FREQUENCY: INTERMITTENT

## 2021-06-10 ASSESSMENT — PAIN - FUNCTIONAL ASSESSMENT: PAIN_FUNCTIONAL_ASSESSMENT: ACTIVITIES ARE NOT PREVENTED

## 2021-06-10 NOTE — ED PROVIDER NOTES
Concern    Not on file   Social History Narrative    Not on file     Social Determinants of Health     Financial Resource Strain:     Difficulty of Paying Living Expenses:    Food Insecurity:     Worried About Running Out of Food in the Last Year:     [address] in the Last Year:    Transportation Needs:     Lack of Transportation (Medical):  Lack of Transportation (Non-Medical):    Physical Activity:     Days of Exercise per Week:     Minutes of Exercise per Session:    Stress:     Feeling of Stress :    Social Connections:     Frequency of Communication with Friends and Family:     Frequency of Social Gatherings with Friends and Family:     Attends [employer] Services:     Active Member of Clubs or Organizations:     Attends Club or Organization Meetings:     Marital Status:    Intimate Partner Violence:     Fear of Current or Ex-Partner:     Emotionally Abused:     Physically Abused:     Sexually Abused:      Current Facility-Administered Medications   Medication Dose Route Frequency Provider Last Rate Last Admin    0.9 % sodium chloride bolus  1,000 mL Intravenous Once Farhan Caicedo MD         No current outpatient medications on file. No Known Allergies    REVIEW OF SYSTEMS  10 systems reviewed, pertinent positives per HPI otherwise noted to be negative. PHYSICAL EXAM  BP (!) 144/99   Pulse 100   Temp 98.9 °F (37.2 °C) (Oral)   Resp 16   Ht 5' 2\" (1.575 m)   Wt 169 lb 12.1 oz (77 kg)   LMP 04/19/2021   SpO2 100%   BMI 31.05 kg/m²    GENERAL APPEARANCE: Awake and alert. Anxious appearing  HENT: Normocephalic. Atraumatic. PERRL. EOMI. No facial droop. HEART/CHEST: Tachycardic to 120. Hypertensive. LUNGS: Respirations unlabored. Speaking comfortably in full sentences. ABDOMEN: Soft, non-distended abdomen. Non tender to palpation. No guarding. No rebound. EXTREMITIES: No gross deformities. Moving all extremities. SKIN: Warm and dry.  No acute rashes. NEUROLOGICAL: Alert and oriented. No gross facial drooping. Answering questions appropriately. Moving all extremities. PSYCHIATRIC: Pleasant. Anxious appearing.     LABS  Results for orders placed or performed during the hospital encounter of 06/10/21   COVID-19, Rapid    Specimen: Nasopharyngeal Swab   Result Value Ref Range    SARS-CoV-2, NAAT Not Detected Not Detected   Comprehensive Metabolic Panel   Result Value Ref Range    Sodium 137 136 - 145 mmol/L    Potassium 3.4 (L) 3.5 - 5.1 mmol/L    Chloride 101 99 - 110 mmol/L    CO2 24 21 - 32 mmol/L    Anion Gap 12 3 - 16    Glucose 104 (H) 70 - 99 mg/dL    BUN 10 7 - 20 mg/dL    CREATININE 0.8 0.6 - 1.1 mg/dL    GFR Non-African American >60 >60    GFR African American >60 >60    Calcium 9.5 8.3 - 10.6 mg/dL    Total Protein 8.3 (H) 6.4 - 8.2 g/dL    Albumin 4.4 3.4 - 5.0 g/dL    Albumin/Globulin Ratio 1.1 1.1 - 2.2    Total Bilirubin 0.4 0.0 - 1.0 mg/dL    Alkaline Phosphatase 78 40 - 129 U/L    ALT 13 10 - 40 U/L    AST 18 15 - 37 U/L    Globulin 3.9 g/dL   CBC Auto Differential   Result Value Ref Range    WBC 8.0 4.0 - 11.0 K/uL    RBC 4.72 4.00 - 5.20 M/uL    Hemoglobin 13.9 12.0 - 16.0 g/dL    Hematocrit 40.8 36.0 - 48.0 %    MCV 86.5 80.0 - 100.0 fL    MCH 29.4 26.0 - 34.0 pg    MCHC 34.0 31.0 - 36.0 g/dL    RDW 13.8 12.4 - 15.4 %    Platelets 840 679 - 994 K/uL    MPV 8.2 5.0 - 10.5 fL    Neutrophils % 71.1 %    Lymphocytes % 21.3 %    Monocytes % 7.2 %    Eosinophils % 0.0 %    Basophils % 0.4 %    Neutrophils Absolute 5.7 1.7 - 7.7 K/uL    Lymphocytes Absolute 1.7 1.0 - 5.1 K/uL    Monocytes Absolute 0.6 0.0 - 1.3 K/uL    Eosinophils Absolute 0.0 0.0 - 0.6 K/uL    Basophils Absolute 0.0 0.0 - 0.2 K/uL   Troponin   Result Value Ref Range    Troponin <0.01 <0.01 ng/mL   HCG Qualitative, Serum   Result Value Ref Range    hCG Qual Negative Detects HCG level >10 MIU/mL   SPECIMEN REJECTION   Result Value Ref Range    Rejected Test dimer     Reason for This chart was created using Dragon dictation software. Efforts were made by me to ensure accuracy, however some errors may be present due to limitations of this technology and occasionally words are not transcribed correctly.        Anne Hogan MD  06/10/21 6881

## 2021-06-10 NOTE — ED NOTES
Patient took off all of her monitoring equipment. States she needs to go outside to smoke. Notified the patient that she could not leave to smoke. Redirected patient back to her room 41.      Chaka Cano RN  06/10/21 1945

## 2021-06-10 NOTE — ED NOTES
Patient refusing an IV and does not want IV fluids. States she will just drink water. Dr. Jessica Lozano notified. Provided patient with water.       Alejandro Jonas RN  06/10/21 2097

## 2021-06-10 NOTE — ED NOTES
Bed: E-41  Expected date:   Expected time:   Means of arrival: [facility]  Comments:  anne marie Amato RN  06/10/21 9070

## 2021-06-11 LAB
EKG ATRIAL RATE: 110 BPM
EKG DIAGNOSIS: NORMAL
EKG P AXIS: 59 DEGREES
EKG P-R INTERVAL: 142 MS
EKG Q-T INTERVAL: 336 MS
EKG QRS DURATION: 82 MS
EKG QTC CALCULATION (BAZETT): 454 MS
EKG R AXIS: 61 DEGREES
EKG T AXIS: 38 DEGREES
EKG VENTRICULAR RATE: 110 BPM

## 2021-06-11 PROCEDURE — 93010 ELECTROCARDIOGRAM REPORT: CPT | Performed by: INTERNAL MEDICINE

## 2021-06-11 NOTE — ED NOTES
Patient verbalized understanding of discharge instructions and follow-up care. Patient was not given any prescriptions. Breathing regular, no distress, skin color, texture, turgor normal. No rashes or lesions, patient alert and oriented. Patient ambulated out of emergency department with steady gait.      Nancy Lemos RN  06/10/21 8058

## 2021-09-14 ENCOUNTER — HOSPITAL ENCOUNTER (EMERGENCY)
Age: 41
Discharge: HOME | End: 2021-09-15
Payer: COMMERCIAL

## 2021-09-14 DIAGNOSIS — F41.1 ANXIETY STATE: Primary | ICD-10-CM

## 2021-09-14 PROCEDURE — 6370000000 HC RX 637 (ALT 250 FOR IP): Performed by: PHYSICIAN ASSISTANT

## 2021-09-14 PROCEDURE — 99285 EMERGENCY DEPT VISIT HI MDM: CPT

## 2021-09-14 RX ORDER — HYDROXYZINE PAMOATE 25 MG/1
25 CAPSULE ORAL ONCE
Status: COMPLETED | OUTPATIENT
Start: 2021-09-14 | End: 2021-09-14

## 2021-09-14 RX ORDER — LORAZEPAM 0.5 MG/1
0.5 TABLET ORAL ONCE
Status: COMPLETED | OUTPATIENT
Start: 2021-09-14 | End: 2021-09-14

## 2021-09-14 RX ADMIN — LORAZEPAM 0.5 MG: 0.5 TABLET ORAL at 23:54

## 2021-09-14 RX ADMIN — HYDROXYZINE PAMOATE 25 MG: 25 CAPSULE ORAL at 22:45

## 2021-09-15 ENCOUNTER — HOSPITAL ENCOUNTER (EMERGENCY)
Age: 41
Discharge: HOME | End: 2021-09-15
Attending: EMERGENCY MEDICINE
Payer: COMMERCIAL

## 2021-09-15 VITALS
DIASTOLIC BLOOD PRESSURE: 101 MMHG | TEMPERATURE: 98.7 F | HEART RATE: 89 BPM | OXYGEN SATURATION: 99 % | SYSTOLIC BLOOD PRESSURE: 148 MMHG | RESPIRATION RATE: 18 BRPM

## 2021-09-15 VITALS
HEIGHT: 62 IN | SYSTOLIC BLOOD PRESSURE: 156 MMHG | RESPIRATION RATE: 20 BRPM | WEIGHT: 160 LBS | TEMPERATURE: 99.3 F | OXYGEN SATURATION: 99 % | HEART RATE: 115 BPM | DIASTOLIC BLOOD PRESSURE: 98 MMHG | BODY MASS INDEX: 29.44 KG/M2

## 2021-09-15 DIAGNOSIS — K02.9 DENTAL CARIES: Primary | ICD-10-CM

## 2021-09-15 PROCEDURE — 99283 EMERGENCY DEPT VISIT LOW MDM: CPT

## 2021-09-15 RX ORDER — PENICILLIN V POTASSIUM 500 MG/1
500 TABLET ORAL 4 TIMES DAILY
Qty: 40 TABLET | Refills: 0 | Status: SHIPPED | OUTPATIENT
Start: 2021-09-15 | End: 2021-09-25

## 2021-09-15 ASSESSMENT — ENCOUNTER SYMPTOMS
VOMITING: 0
WHEEZING: 0
DIARRHEA: 0
CHEST TIGHTNESS: 0
RESPIRATORY NEGATIVE: 1
COUGH: 0
ABDOMINAL PAIN: 0
NAUSEA: 0
EYES NEGATIVE: 1
SHORTNESS OF BREATH: 0

## 2021-09-15 NOTE — ED NOTES
Bed: C28-28  Expected date:   Expected time:   Means of arrival:   Comments:  [address] Madeleine Wells RN  09/14/21 5317

## 2021-09-15 NOTE — ED NOTES
Pt was explained discharge instructions and questions where answered. Pt was communicating in full and complete sentences.       Sung Graf RN  09/15/21 6237 (4) excellent

## 2021-09-15 NOTE — ED PROVIDER NOTES
[address] [facility] 71 ENCOUNTER          ATTENDING PHYSICIAN NOTE       Date of evaluation: 9/15/2021    Chief Complaint     Dental Pain      History of Present Illness     Jaxon Leung is a 46 y.o. female who presents to the emergency  department complaining of tooth pain. Patient states that she has had multiple cavities to the teeth and left upper jaw for several months. She states over the last several days she has been having increasing swelling to this area and is concerned that she may have an infection. She denies any fevers or chills. She denies any difficulty breathing or swallowing. She states that she does not have a dentist with whom she follows regularly but does have dental insurance. Of note, patient was seen in the emergency department approximately 8 hours prior to presentation for anxiety and did not complain of dental pain at that time. Review of Systems     Review of Systems   Constitutional: Negative. HENT: Positive for dental problem. Eyes: Negative. Respiratory: Negative. Genitourinary: Negative. Musculoskeletal: Negative. Neurological: Negative. All other systems reviewed and are negative. Past Medical, Surgical, Family, and Social History     She has a past medical history of Hypertension. She has a past surgical history that includes Salpingo-oophorectomy (Left). Her family history is not on file. She reports that she has been smoking cigarettes. She has been smoking about 1.00 pack per day. She has never used smokeless tobacco. She reports current alcohol use. She reports current drug use. Drug: Marijuana. Medications     Previous Medications    No medications on file       Allergies     She has No Known Allergies. Physical Exam     INITIAL VITALS: BP: (!) 148/101, Temp: 98.7 °F (37.1 °C), Pulse: 89, Resp: 18, SpO2: 99 %   Physical Exam  Vitals and nursing note reviewed.    Constitutional:       General: She is not in acute distress. HENT:      Head: Normocephalic and atraumatic. Mouth/Throat:      Dentition: Dental tenderness and dental caries present. No gingival swelling or dental abscesses. Comments: Multiple dental caries present, predominantly to teeth #13 and 14, with no gingival erythema or edema noted. No buccal space swelling or sublingual swelling noted. Eyes:      General: No scleral icterus. Extraocular Movements: Extraocular movements intact. Conjunctiva/sclera: Conjunctivae normal.      Pupils: Pupils are equal, round, and reactive to light. Musculoskeletal:      Cervical back: Normal range of motion and neck supple. Lymphadenopathy:      Cervical: No cervical adenopathy. Neurological:      General: No focal deficit present. Mental Status: She is alert and oriented to person, place, and time. Cranial Nerves: No cranial nerve deficit. Diagnostic Results     RECENT VITALS:  BP: (!) 148/101,Temp: 98.7 °F (37.1 °C), Pulse: 89, Resp: 18, SpO2: 99 %     Procedures     N/A    ED Course     Nursing Notes, Past Medical Hx, Past Surgical Hx, Social Hx,Allergies, and Family Hx were reviewed. patient was given the following medications:  Orders Placed This Encounter   Medications    penicillin v potassium (VEETID) 500 MG tablet     Sig: Take 1 tablet by mouth 4 times daily for 10 days     Dispense:  40 tablet     Refill:  0       CONSULTS:  None    MEDICAL DECISIONMAKING / ASSESSMENT / Duke Real is a 46 y.o. female presents complaining of tooth pain. Patient reports dental caries have been present for a long period of time with new onset of pain and swelling. Patient has no obvious swelling on exam.  She does have multiple dental caries present. I cannot rule out a periapical abscess of the patient will be placed on penicillin. She will be instructed to follow-up with a dentist of her choice for definitive management. Clinical Impression     1.  Dental caries Disposition     PATIENT REFERRED TO:  Dentist of your choice            DISCHARGE MEDICATIONS:  New Prescriptions    PENICILLIN V POTASSIUM (VEETID) 500 MG TABLET    Take 1 tablet by mouth 4 times daily for 10 days       DISPOSITION Decision To Discharge 09/15/2021 04:29:45 AM        Layla Granger MD  09/15/21 2929

## 2021-09-15 NOTE — ED PROVIDER NOTES
[address] [facility] 71 ENCOUNTER          PHYSICIAN ASSISTANT NOTE       Date of evaluation: 9/14/2021    Chief Complaint     Anxiety (pt states, \"I have anxiety and stress is what they said. Every hospital tells me that I have anxiety and stress. \")      History of Present Illness     Stormy Valles is a 46 y.o. female who presents with chief complaint of anxiety. Patient presents to the emergency department today quite anxious and tearful. Patient endorses that she has been trying to help people in the neighborhood, although she believes that they are returning on her. States that she has been locking her windows, although after these neighbors use her restroom, notices that the window is unlocked in the bathroom and she is concerned that they are going to try and break into her house. States that this has been occurring very frequently lately and she is scared that something will happen to her. States that she did call the police today and they evaluated her house and stated that there is nothing to be concerned about. Does have a safe place to stay at her sisters if she needs to. This has made her feel very paranoid of her surroundings which has subsequently triggered her anxiety to worsen. States that she does have a history of anxiety, although no other mental health disorder. She does not take any medications at baseline. States that she has also noticed that when she feels significantly anxious, her heart rate increases and she feels tingling in her fingertips. Endorses that she can usually do deep breathing to get out of her head, although has not been able to do this lately. Has had trouble sleeping. Denies any homicidal or suicidal thoughts. Review of Systems     Review of Systems   Constitutional: Negative for chills, diaphoresis, fatigue and fever. Respiratory: Negative for cough, chest tightness, shortness of breath and wheezing.     Cardiovascular: Negative for chest pain and palpitations. Gastrointestinal: Negative for abdominal pain, diarrhea, nausea and vomiting. Genitourinary: Negative. Musculoskeletal: Negative. Skin: Negative for rash. Neurological: Negative for dizziness, weakness, light-headedness and headaches. Psychiatric/Behavioral: Positive for sleep disturbance. The patient is nervous/anxious. All other systems reviewed and are negative. Past Medical, Surgical, Family, and Social History     She has a past medical history of Hypertension. She has a past surgical history that includes Salpingo-oophorectomy (Left). Her family history is not on file. She reports that she has been smoking cigarettes. She has been smoking about 1.00 pack per day. She has never used smokeless tobacco. She reports current alcohol use. She reports current drug use. Drug: Marijuana. Medications     There are no discharge medications for this patient. Allergies     She has No Known Allergies. Physical Exam     INITIAL VITALS: BP: (!) 158/107, Temp: 98 °F (36.7 °C), Pulse: 102, Resp: 20, SpO2: 100 %  Physical Exam  Vitals and nursing note reviewed. Constitutional:       General: She is not in acute distress. Appearance: She is well-developed. She is not diaphoretic. HENT:      Head: Normocephalic and atraumatic. Eyes:      Conjunctiva/sclera: Conjunctivae normal.      Pupils: Pupils are equal, round, and reactive to light. Cardiovascular:      Rate and Rhythm: Regular rhythm. Tachycardia present. Heart sounds: Normal heart sounds. No murmur heard. No friction rub. Pulmonary:      Effort: Pulmonary effort is normal. No respiratory distress. Breath sounds: Normal breath sounds. No wheezing or rales. Musculoskeletal:         General: Normal range of motion. Cervical back: Normal range of motion. Skin:     General: Skin is warm and dry. Neurological:      Mental Status: She is alert and oriented to person, place, and time. Psychiatric:         Behavior: Behavior normal.      Comments: Significantly tearful. Anxious and appears mildly paranoid         Diagnostic Results     EKG   none    RADIOLOGY:  No orders to display       LABS:   No results found for this visit on 09/14/21. ED BEDSIDE ULTRASOUND:  none    RECENT VITALS:  BP: (!) 156/98, Temp: 99.3 °F (37.4 °C), Pulse: 115, Resp: 20, SpO2: 99 %     Procedures     none    ED Course     Nursing Notes, Past Medical Hx,Past Surgical Hx, Social Hx, Allergies, and Family Hx were reviewed. The patient was given the following medications:  Orders Placed This Encounter   Medications    hydrOXYzine (VISTARIL) capsule 25 mg    LORazepam (ATIVAN) tablet 0.5 mg       CONSULTS:  None    MEDICAL Sloane Miramontes / LAURA / Cal Adrianna is a 46 y.o. female presenting with concerns of anxiety. Patient does appear fairly paranoid and is speaking in whispers. She states that she is concerned that there are people following her and planning for ways to break into her home to steal everything from her. Patient is tachycardic and mildly hypertensive on arrival.  She does appear anxious and is quite tearful throughout interview. She is very well-kempt and appears to be taking care of herself. Does not appear to be responding to internal stimuli. Speaking in full and complete sentences, not perseverating. Does not appear acutely decompensated. She has no known history of psychiatric disorder other than reported anxiety. Physical examination is overall reassuring. Patient does have sinus tachycardia, which does resolve with deep breathing exercises. There are no other concerning findings on examination. Patient was offered Vistaril, although states that she has never had this medication is scared to take it. Was agreeable to trying a low-dose of Ativan with mild improvement of the anxiety symptoms that she is experiencing.     At this time, I believe patient's symptoms are most likely secondary to anxiety. Patient does appear fairly paranoid, although unclear if these are delusions or a factorial presentation. Patient does not appear acutely decompensated and is well kempt. Does not believe to be a danger to herself or others. No indication for psychiatric hold at this time. Discussed with patient that I do believe that she would benefit from speaking with a psychiatrist and multiple ways to be seen on a more urgent basis were provided. She is in agreement with this plan. Return precautions back to emergency department were discussed, discharged in stable condition. This patient was independently evaluated by the advanced practice provider. Clinical Impression     1. Anxiety state        Disposition     PATIENT REFERRED TO:  No follow-up provider specified. DISCHARGE MEDICATIONS:  There are no discharge medications for this patient.       DISPOSITION Decision To Discharge 09/14/2021 11:56:24 PM        MYRTLE Rosas  09/15/21 0102

## 2023-07-01 ENCOUNTER — HOSPITAL ENCOUNTER (EMERGENCY)
Age: 43
Discharge: HOME | End: 2023-07-01
Attending: EMERGENCY MEDICINE
Payer: COMMERCIAL

## 2023-07-01 ENCOUNTER — APPOINTMENT (OUTPATIENT)
Dept: GENERAL RADIOLOGY | Age: 43
End: 2023-07-01
Payer: COMMERCIAL

## 2023-07-01 VITALS
HEIGHT: 62 IN | WEIGHT: 170 LBS | BODY MASS INDEX: 31.28 KG/M2 | OXYGEN SATURATION: 97 % | SYSTOLIC BLOOD PRESSURE: 125 MMHG | HEART RATE: 94 BPM | TEMPERATURE: 99 F | DIASTOLIC BLOOD PRESSURE: 77 MMHG | RESPIRATION RATE: 24 BRPM

## 2023-07-01 DIAGNOSIS — F41.1 ANXIETY STATE: ICD-10-CM

## 2023-07-01 DIAGNOSIS — R07.9 CHEST PAIN, UNSPECIFIED TYPE: Primary | ICD-10-CM

## 2023-07-01 LAB
ALBUMIN SERPL-MCNC: 4.5 G/DL (ref 3.4–5)
ALBUMIN/GLOB SERPL: 1.3 {RATIO} (ref 1.1–2.2)
ALP SERPL-CCNC: 84 U/L (ref 40–129)
ALT SERPL-CCNC: 12 U/L (ref 10–40)
ANION GAP SERPL CALCULATED.3IONS-SCNC: 12 MMOL/L (ref 3–16)
AST SERPL-CCNC: 15 U/L (ref 15–37)
BASOPHILS # BLD: 0 K/UL (ref 0–0.2)
BASOPHILS NFR BLD: 0.5 %
BILIRUB SERPL-MCNC: <0.2 MG/DL (ref 0–1)
BUN SERPL-MCNC: 14 MG/DL (ref 7–20)
CALCIUM SERPL-MCNC: 9.5 MG/DL (ref 8.3–10.6)
CHLORIDE SERPL-SCNC: 106 MMOL/L (ref 99–110)
CO2 SERPL-SCNC: 25 MMOL/L (ref 21–32)
CREAT SERPL-MCNC: 0.8 MG/DL (ref 0.6–1.1)
D DIMER: <0.27 UG/ML FEU (ref 0–0.6)
DEPRECATED RDW RBC AUTO: 14.4 % (ref 12.4–15.4)
EOSINOPHIL # BLD: 0.1 K/UL (ref 0–0.6)
EOSINOPHIL NFR BLD: 1.3 %
GFR SERPLBLD CREATININE-BSD FMLA CKD-EPI: >60 ML/MIN/{1.73_M2}
GLUCOSE SERPL-MCNC: 120 MG/DL (ref 70–99)
HCT VFR BLD AUTO: 37.3 % (ref 36–48)
HGB BLD-MCNC: 12.6 G/DL (ref 12–16)
LYMPHOCYTES # BLD: 2.1 K/UL (ref 1–5.1)
LYMPHOCYTES NFR BLD: 26.5 %
MCH RBC QN AUTO: 28.4 PG (ref 26–34)
MCHC RBC AUTO-ENTMCNC: 33.7 G/DL (ref 31–36)
MCV RBC AUTO: 84.3 FL (ref 80–100)
MONOCYTES # BLD: 0.5 K/UL (ref 0–1.3)
MONOCYTES NFR BLD: 6.8 %
NEUTROPHILS # BLD: 5.2 K/UL (ref 1.7–7.7)
NEUTROPHILS NFR BLD: 64.9 %
NT-PROBNP SERPL-MCNC: <36 PG/ML (ref 0–124)
PLATELET # BLD AUTO: 310 K/UL (ref 135–450)
PMV BLD AUTO: 8.2 FL (ref 5–10.5)
POTASSIUM SERPL-SCNC: 3.7 MMOL/L (ref 3.5–5.1)
PROT SERPL-MCNC: 7.9 G/DL (ref 6.4–8.2)
RBC # BLD AUTO: 4.43 M/UL (ref 4–5.2)
SODIUM SERPL-SCNC: 143 MMOL/L (ref 136–145)
TROPONIN, HIGH SENSITIVITY: <6 NG/L (ref 0–14)
TROPONIN, HIGH SENSITIVITY: <6 NG/L (ref 0–14)
WBC # BLD AUTO: 8 K/UL (ref 4–11)

## 2023-07-01 PROCEDURE — 85025 COMPLETE CBC W/AUTO DIFF WBC: CPT

## 2023-07-01 PROCEDURE — 99285 EMERGENCY DEPT VISIT HI MDM: CPT

## 2023-07-01 PROCEDURE — 96374 THER/PROPH/DIAG INJ IV PUSH: CPT

## 2023-07-01 PROCEDURE — 83880 ASSAY OF NATRIURETIC PEPTIDE: CPT

## 2023-07-01 PROCEDURE — 6370000000 HC RX 637 (ALT 250 FOR IP): Performed by: EMERGENCY MEDICINE

## 2023-07-01 PROCEDURE — 84484 ASSAY OF TROPONIN QUANT: CPT

## 2023-07-01 PROCEDURE — 85379 FIBRIN DEGRADATION QUANT: CPT

## 2023-07-01 PROCEDURE — 71046 X-RAY EXAM CHEST 2 VIEWS: CPT

## 2023-07-01 PROCEDURE — 93005 ELECTROCARDIOGRAM TRACING: CPT | Performed by: EMERGENCY MEDICINE

## 2023-07-01 PROCEDURE — 80053 COMPREHEN METABOLIC PANEL: CPT

## 2023-07-01 PROCEDURE — 6360000002 HC RX W HCPCS: Performed by: EMERGENCY MEDICINE

## 2023-07-01 PROCEDURE — 2580000003 HC RX 258: Performed by: EMERGENCY MEDICINE

## 2023-07-01 PROCEDURE — 36415 COLL VENOUS BLD VENIPUNCTURE: CPT

## 2023-07-01 RX ORDER — 0.9 % SODIUM CHLORIDE 0.9 %
500 INTRAVENOUS SOLUTION INTRAVENOUS ONCE
Status: COMPLETED | OUTPATIENT
Start: 2023-07-01 | End: 2023-07-01

## 2023-07-01 RX ORDER — LORAZEPAM 0.5 MG/1
1 TABLET ORAL ONCE
Status: COMPLETED | OUTPATIENT
Start: 2023-07-01 | End: 2023-07-01

## 2023-07-01 RX ORDER — KETOROLAC TROMETHAMINE 30 MG/ML
30 INJECTION, SOLUTION INTRAMUSCULAR; INTRAVENOUS ONCE
Status: COMPLETED | OUTPATIENT
Start: 2023-07-01 | End: 2023-07-01

## 2023-07-01 RX ADMIN — LORAZEPAM 1 MG: 0.5 TABLET ORAL at 12:45

## 2023-07-01 RX ADMIN — SODIUM CHLORIDE 500 ML: 9 INJECTION, SOLUTION INTRAVENOUS at 13:04

## 2023-07-01 RX ADMIN — KETOROLAC TROMETHAMINE 30 MG: 30 INJECTION, SOLUTION INTRAMUSCULAR; INTRAVENOUS at 13:50

## 2023-07-01 ASSESSMENT — PAIN DESCRIPTION - LOCATION
LOCATION_2: HAND
LOCATION: CHEST;THROAT

## 2023-07-01 ASSESSMENT — PAIN - FUNCTIONAL ASSESSMENT
PAIN_FUNCTIONAL_ASSESSMENT_SITE2: ACTIVITIES ARE NOT PREVENTED
PAIN_FUNCTIONAL_ASSESSMENT: ACTIVITIES ARE NOT PREVENTED
PAIN_FUNCTIONAL_ASSESSMENT: 0-10

## 2023-07-01 ASSESSMENT — PAIN DESCRIPTION - ONSET: ONSET: ON-GOING

## 2023-07-01 ASSESSMENT — PAIN DESCRIPTION - ORIENTATION: ORIENTATION_2: RIGHT

## 2023-07-01 ASSESSMENT — PAIN DESCRIPTION - FREQUENCY: FREQUENCY: CONTINUOUS

## 2023-07-01 ASSESSMENT — PAIN DESCRIPTION - DESCRIPTORS
DESCRIPTORS_2: NUMBNESS
DESCRIPTORS: HEAVINESS

## 2023-07-01 ASSESSMENT — PAIN SCALES - GENERAL: PAINLEVEL_OUTOF10: 7

## 2023-07-01 ASSESSMENT — PAIN DESCRIPTION - PAIN TYPE: TYPE: ACUTE PAIN

## 2023-07-02 LAB
EKG ATRIAL RATE: 108 BPM
EKG DIAGNOSIS: NORMAL
EKG P AXIS: 60 DEGREES
EKG P-R INTERVAL: 154 MS
EKG Q-T INTERVAL: 342 MS
EKG QRS DURATION: 78 MS
EKG QTC CALCULATION (BAZETT): 458 MS
EKG R AXIS: 64 DEGREES
EKG T AXIS: 42 DEGREES
EKG VENTRICULAR RATE: 108 BPM

## 2023-07-02 PROCEDURE — 93010 ELECTROCARDIOGRAM REPORT: CPT | Performed by: INTERNAL MEDICINE

## 2024-01-05 ENCOUNTER — HOSPITAL ENCOUNTER (EMERGENCY)
Age: 44
Discharge: HOME | End: 2024-01-06
Attending: EMERGENCY MEDICINE
Payer: COMMERCIAL

## 2024-01-05 VITALS
SYSTOLIC BLOOD PRESSURE: 131 MMHG | RESPIRATION RATE: 20 BRPM | HEART RATE: 68 BPM | WEIGHT: 186.07 LBS | HEIGHT: 60 IN | OXYGEN SATURATION: 100 % | TEMPERATURE: 98.4 F | DIASTOLIC BLOOD PRESSURE: 69 MMHG | BODY MASS INDEX: 36.53 KG/M2

## 2024-01-05 DIAGNOSIS — T26.90XA CHEMICAL BURN OF EYE: Primary | ICD-10-CM

## 2024-01-05 PROCEDURE — 99283 EMERGENCY DEPT VISIT LOW MDM: CPT

## 2024-01-05 PROCEDURE — 6370000000 HC RX 637 (ALT 250 FOR IP): Performed by: PHYSICIAN ASSISTANT

## 2024-01-05 RX ORDER — HYDROCODONE BITARTRATE AND ACETAMINOPHEN 5; 325 MG/1; MG/1
1 TABLET ORAL ONCE
Status: COMPLETED | OUTPATIENT
Start: 2024-01-05 | End: 2024-01-05

## 2024-01-05 RX ORDER — TETRACAINE HYDROCHLORIDE 5 MG/ML
1 SOLUTION OPHTHALMIC ONCE
Status: COMPLETED | OUTPATIENT
Start: 2024-01-05 | End: 2024-01-05

## 2024-01-05 RX ORDER — MOXIFLOXACIN 5 MG/ML
1 SOLUTION/ DROPS OPHTHALMIC
Qty: 3 ML | Refills: 0 | Status: SHIPPED | OUTPATIENT
Start: 2024-01-05 | End: 2024-01-10

## 2024-01-05 RX ORDER — MOXIFLOXACIN 5 MG/ML
1 SOLUTION/ DROPS OPHTHALMIC
Qty: 3 ML | Refills: 0 | Status: SHIPPED | OUTPATIENT
Start: 2024-01-05 | End: 2024-01-05

## 2024-01-05 RX ORDER — LORAZEPAM 1 MG/1
1 TABLET ORAL ONCE
Status: COMPLETED | OUTPATIENT
Start: 2024-01-05 | End: 2024-01-05

## 2024-01-05 RX ADMIN — HYDROCODONE BITARTRATE AND ACETAMINOPHEN 1 TABLET: 5; 325 TABLET ORAL at 22:32

## 2024-01-05 RX ADMIN — LORAZEPAM 1 MG: 1 TABLET ORAL at 19:30

## 2024-01-05 RX ADMIN — TETRACAINE HYDROCHLORIDE 1 DROP: 5 SOLUTION OPHTHALMIC at 19:29

## 2024-01-05 ASSESSMENT — PAIN DESCRIPTION - ORIENTATION: ORIENTATION: LEFT;RIGHT

## 2024-01-05 ASSESSMENT — VISUAL ACUITY
OS: 20/200
OD: 20/200
OU: 20/100

## 2024-01-05 ASSESSMENT — PAIN SCALES - GENERAL
PAINLEVEL_OUTOF10: 10
PAINLEVEL_OUTOF10: 10

## 2024-01-05 ASSESSMENT — PAIN - FUNCTIONAL ASSESSMENT: PAIN_FUNCTIONAL_ASSESSMENT: 0-10

## 2024-01-05 ASSESSMENT — PAIN DESCRIPTION - LOCATION: LOCATION: EYE

## 2024-01-05 ASSESSMENT — PAIN DESCRIPTION - DESCRIPTORS: DESCRIPTORS: BURNING

## 2024-01-05 ASSESSMENT — PAIN DESCRIPTION - FREQUENCY: FREQUENCY: CONTINUOUS

## 2024-01-05 ASSESSMENT — PAIN DESCRIPTION - PAIN TYPE: TYPE: ACUTE PAIN

## 2024-01-05 NOTE — ED TRIAGE NOTES
Pt in by squad from home. Pt states she put in an old pair of contacts this morning.  Took them out, but is having bilateral eye burning and irritation since.

## 2024-01-06 PROCEDURE — 6370000000 HC RX 637 (ALT 250 FOR IP): Performed by: PHYSICIAN ASSISTANT

## 2024-01-06 RX ORDER — ACETAMINOPHEN 500 MG
1000 TABLET ORAL EVERY 8 HOURS PRN
Qty: 60 TABLET | Refills: 0 | Status: SHIPPED | OUTPATIENT
Start: 2024-01-06 | End: 2024-01-16

## 2024-01-06 RX ORDER — CIPROFLOXACIN HYDROCHLORIDE 3.5 MG/ML
1 SOLUTION/ DROPS TOPICAL ONCE
Status: COMPLETED | OUTPATIENT
Start: 2024-01-06 | End: 2024-01-06

## 2024-01-06 RX ORDER — ACETAMINOPHEN 500 MG
1000 TABLET ORAL EVERY 8 HOURS PRN
Qty: 60 TABLET | Refills: 0 | Status: SHIPPED | OUTPATIENT
Start: 2024-01-06 | End: 2024-01-06

## 2024-01-06 RX ORDER — IBUPROFEN 600 MG/1
600 TABLET ORAL 3 TIMES DAILY PRN
Qty: 30 TABLET | Refills: 0 | Status: SHIPPED | OUTPATIENT
Start: 2024-01-06 | End: 2024-01-06

## 2024-01-06 RX ORDER — IBUPROFEN 600 MG/1
600 TABLET ORAL 3 TIMES DAILY PRN
Qty: 30 TABLET | Refills: 0 | Status: SHIPPED | OUTPATIENT
Start: 2024-01-06

## 2024-01-06 RX ADMIN — CIPROFLOXACIN HYDROCHLORIDE 1 DROP: 3 SOLUTION/ DROPS OPHTHALMIC at 01:36

## 2024-01-06 NOTE — DISCHARGE INSTRUCTIONS
[facility] will call you tomorrow.  They have an urgent clinic in [place].  They will call you likely around 830, may be after.  They have a clinic from 8 30-11 30.  You need to be seen within these hours.  Take antibiotic eyedrops as prescribed starting tonight.  Return as needed.  Unfortunately you state that you are unable to  recommended antibiotic eye drop. Will place you on an eye drop in similar class. However, the opthamologist may recommend that you use other drop. Talk to them at your appointment tomorrow. Use this eye drop every 2 hours until then.

## 2024-01-06 NOTE — ED NOTES
Attempted to irrigate the patients eyes but she refused and fought the procedure. ED provider notified.

## 2024-01-06 NOTE — ED NOTES
Discharge and education instructions reviewed. Patient verbalized understanding, teach-back successful. Patient denied questions at this time. No acute distress noted. Patient instructed to follow-up as noted - return to emergency department if symptoms worsen. Patient verbalized understanding. Discharged per EDMD with discharge instructions.     Pt provided (3) paper prescriptions to have filled at the pharmacy of her choice. Ride requested through [facility] program to return her to the location of her choice. Pt discharged to [facility] with instruction to watch her cell phone for contact from her ride. Pt understands.

## 2024-01-06 NOTE — ED NOTES
Patient's eyes irrigated at eye station for 2 minutes. Pt states that she feels better and wants to discontinue eye wash. Pt told to try and irrigate eye for 5-10 minutes but patient refused. Pt moved back to room.

## 2024-01-06 NOTE — ED NOTES
Patient alert and orientated x4.  Patient in the lobby screaming.  Charge RN offered to have patient sign back in.  [facility] arrived.  Patient requested to go to another hospital.  EMS explained it is against there policy to take patient to another hospital.  Charge RN explained also explained this.  Charge RN offered to have another provider.  Patient refused.  Security called to the lobby.  [facility] police arrived.  Patient offered another lyft or sign back in to see another provider. Patient walked independently outside with police following.  Patient left in the Lyft.  Nursing supervisor updated to events.

## 2024-01-06 NOTE — ED NOTES
Pt non compliant with santiago lens at this time and is in distress. Pt moved to eye wash station per Tanna Cottrell PA-C request.

## 2024-01-06 NOTE — ED NOTES
Lennox lens eye irrigation started at this time on patient's right eye. Pt able to tolerate 200 ml of irrigation before requesting to take it out.

## 2024-01-06 NOTE — ED PROVIDER NOTES
[facility] EMERGENCY DEPARTMENT  EMERGENCY DEPARTMENT ENCOUNTER        Pt Name: Jayda Zamora  MRN: [id number]  Birthdate 1980  Date of evaluation: 1/5/2024  Provider: Tanna Cottrell PA-C  PCP: Rich Keenan MD  Note Started: 1:21 AM EST 1/6/24      [facility]. I have evaluated this patient.        CHIEF COMPLAINT       Chief Complaint   Patient presents with    Eye Pain     Pt in by squad from home. Pt states she put in an old pair of contacts this morning.  Took them out, but is having bilateral eye burning and irritation since.         HISTORY OF PRESENT ILLNESS: 1 or more Elements             Jayda Zamora is a 43 y.o. female who presents to the emergency department with complaint of bilateral eye pain that started around 1 PM today.  Patient states that she had found some colored contacts yesterday that were dried out.  She put them in some sort of \"clear eye solution \".  She let them sit today and then rinse them once again with what she says is the same solution.  She then placed him in her eyes.  Immediately she started having a \"pins and needle \"feeling.  Her eyes started tearing up.  She then spent about an hour putting on her make-up.  The pain became very severe so she took the contacts out.  The pain persisted.  She took the pain medication at home and went to bed.  When she woke up she was having difficulty opening her eyes so she called 911 to bring her in for further evaluation.  These were colored contacts.  She does not normally require contacts or glasses to see.    Nursing Notes were all reviewed and agreed with or any disagreements were addressed in the HPI.    REVIEW OF SYSTEMS :      Review of Systems   All other systems reviewed and are negative.      Positives and Pertinent negatives as per HPI.     SURGICAL HISTORY     Past Surgical History:   Procedure Laterality Date    SALPINGO-OOPHORECTOMY Left        CURRENTMEDICATIONS       Discharge Medication List as of

## 2024-01-06 NOTE — ED NOTES
Patient's left eye irrigated via santiago lens at this time. Pt able to tolerate total of 150 ml of irrigation before refusal. Tanan Cottrell PA-C made aware.

## 2024-01-06 NOTE — ED NOTES
Pt becoming increasingly agitated at eye wash station and can only attempt to irrigate in short bouts. Tanna Cottrell PA-C made aware.

## 2024-01-06 NOTE — ED PROVIDER NOTES
Attending Note:    The patient was seen and examined by the mid-level provider.  I also completed a face-to-face examination.      HPI: Jayda Zamora is a 43 y.o. female who presents to the emergency department with a complaint of bilateral eye irritation right greater than left, burning to the eyes, tearing, and sensitivity to light.    She states that she had a court date today and was getting ready to go there, putting on her make-up, etc. around 1:30 PM.  She reports that she wears colored contact lenses periodically.  She did not have any of her contacts but thought that she would wear her sisters contacts.  She noticed that they were a little bit dried up in the case and had previously been worn.  She does acknowledge that her sister has recently been sick with cough and cold symptoms.  She reports that she moistened the contacts with some eyedrops which were nearby and after a few minutes was able to put them into her eyes.  She immediately felt irritation and stinging and after approximately 30 minutes she ended up taking them out at 2 PM.  She has had gradually worsening eye irritation since that occurred.  She felt fine prior to this event.    She denies any fever or chills nausea vomiting or diarrhea.  No cough or cold symptoms.  She denies any headache earache sore throat or sinus drainage.  No abdominal pain nausea vomiting or diarrhea.    She denies any direct trauma to the eyes.  She denies any visual loss or visual change.  No headache.  She denies any foreign body to the eyes.    She normally does not wear glasses or corrective contacts.      Physical Exam:     Constitutional: No apparent distress.  Head: No visible evidence of trauma.  Normocephalic.  Eyes: Pupils equal and reactive.  Pupils are 3 mm and reactive bilaterally.  Extraocular muscles are intact.  No pain with extraocular movement.  Lids were normal in appearance.  Lashes were normal.  No purulent drainage.   Lacrimal ducts were patent.  was advised to drink plenty of fluids.  If her condition worsens or new symptoms develop, return immediately to the emergency department.          I personally saw and made/approved the management plan and take responsibility for the patient management.      CRITICAL CARE TIME     I personally saw the patient and independently provided 0 minutes of non-concurrent critical care out of the total shared critical care time provided. This excludes separately reportable procedures.    There was a high probability of clinically significant/life threatening deterioration in the patient's condition which required my urgent intervention.      CONSULTS:  IP CONSULT TO OPHTHALMOLOGY    PROCEDURES:  Unless otherwise noted below, none     Procedures    Slit-lamp exam:    The patient was given 2 drops of tetracaine ophthalmic solution 0.5% and 2 drops of fluorescein stain to both eyes and was examined under the slit-lamp.  There is broad-based fluorescein uptake across the right cornea and in the central portion of the cornea there is a small 2 to 3 mm circular white cloudy area that appears to be consistent with a corneal ulcer.  No visible foreign body.  Given the time of onset of symptoms today just at 1:30 PM, I suspect that this is chemical induced.  Lids were everted as best as possible and there was no evidence of any foreign body.  However, the patient poorly tolerated eversion of the lids secondary to discomfort.  There was some mild photophobia in the right eye as well.  Mild conjunctival erythema noted.  No purulent drainage.  Anterior chamber was clear.        FINAL IMPRESSION      1. Chemical burn of eye          DISPOSITION/PLAN   DISPOSITION Discharge - Pending Orders Complete 01/05/2024 11:05:57 PM      PATIENT REFERRED TO:  [facility]  [id number]  [facility] [id number]  [phone]          [facility] Emergency Department  [address] [facility]

## 2025-05-05 ENCOUNTER — HOSPITAL ENCOUNTER (EMERGENCY)
Age: 45
Discharge: HOME | End: 2025-05-06
Attending: EMERGENCY MEDICINE
Payer: COMMERCIAL

## 2025-05-05 VITALS
HEART RATE: 91 BPM | TEMPERATURE: 98 F | HEIGHT: 62 IN | SYSTOLIC BLOOD PRESSURE: 149 MMHG | OXYGEN SATURATION: 100 % | RESPIRATION RATE: 16 BRPM | WEIGHT: 162 LBS | DIASTOLIC BLOOD PRESSURE: 90 MMHG | BODY MASS INDEX: 29.81 KG/M2

## 2025-05-05 DIAGNOSIS — B96.89 BACTERIAL VAGINITIS: Primary | ICD-10-CM

## 2025-05-05 DIAGNOSIS — N76.0 BACTERIAL VAGINITIS: Primary | ICD-10-CM

## 2025-05-05 LAB
BACTERIA GENITAL QL WET PREP: ABNORMAL
BILIRUB UR QL STRIP.AUTO: NEGATIVE
CLARITY UR: CLEAR
CLUE CELLS SPEC QL WET PREP: ABNORMAL
COLOR UR: YELLOW
EPI CELLS #/AREA URNS HPF: ABNORMAL /HPF (ref 0–5)
EPI CELLS SPEC QL WET PREP: ABNORMAL
GLUCOSE UR STRIP.AUTO-MCNC: NEGATIVE MG/DL
HCG UR QL: NEGATIVE
HGB UR QL STRIP.AUTO: NEGATIVE
KETONES UR STRIP.AUTO-MCNC: NEGATIVE MG/DL
LEUKOCYTE ESTERASE UR QL STRIP.AUTO: NEGATIVE
MUCOUS THREADS #/AREA URNS LPF: ABNORMAL /LPF
NITRITE UR QL STRIP.AUTO: NEGATIVE
PH UR STRIP.AUTO: 6.5 [PH] (ref 5–8)
PROT UR STRIP.AUTO-MCNC: ABNORMAL MG/DL
RBC #/AREA URNS HPF: ABNORMAL /HPF (ref 0–4)
RBC SPEC QL WET PREP: ABNORMAL
SP GR UR STRIP.AUTO: 1.02 (ref 1–1.03)
SPECIMEN SOURCE FLD: ABNORMAL
T VAGINALIS GENITAL QL WET PREP: ABNORMAL
UA COMPLETE W REFLEX CULTURE PNL UR: ABNORMAL
UA DIPSTICK W REFLEX MICRO PNL UR: YES
URN SPEC COLLECT METH UR: ABNORMAL
UROBILINOGEN UR STRIP-ACNC: 2 E.U./DL
WBC #/AREA URNS HPF: ABNORMAL /HPF (ref 0–5)
WBC SPEC QL WET PREP: ABNORMAL
YEAST GENITAL QL WET PREP: ABNORMAL

## 2025-05-05 PROCEDURE — 84703 CHORIONIC GONADOTROPIN ASSAY: CPT

## 2025-05-05 PROCEDURE — 6370000000 HC RX 637 (ALT 250 FOR IP): Performed by: EMERGENCY MEDICINE

## 2025-05-05 PROCEDURE — 87210 SMEAR WET MOUNT SALINE/INK: CPT

## 2025-05-05 PROCEDURE — 87491 CHLMYD TRACH DNA AMP PROBE: CPT

## 2025-05-05 PROCEDURE — 87591 N.GONORRHOEAE DNA AMP PROB: CPT

## 2025-05-05 PROCEDURE — 99283 EMERGENCY DEPT VISIT LOW MDM: CPT

## 2025-05-05 PROCEDURE — 81001 URINALYSIS AUTO W/SCOPE: CPT

## 2025-05-05 RX ORDER — NAPROXEN 250 MG/1
500 TABLET ORAL ONCE
Status: COMPLETED | OUTPATIENT
Start: 2025-05-05 | End: 2025-05-05

## 2025-05-05 RX ADMIN — NAPROXEN 500 MG: 250 TABLET ORAL at 23:15

## 2025-05-06 PROCEDURE — 6370000000 HC RX 637 (ALT 250 FOR IP): Performed by: EMERGENCY MEDICINE

## 2025-05-06 RX ORDER — METRONIDAZOLE 500 MG/1
500 TABLET ORAL 2 TIMES DAILY
Qty: 14 TABLET | Refills: 0 | Status: SHIPPED | OUTPATIENT
Start: 2025-05-06 | End: 2025-05-13

## 2025-05-06 RX ORDER — METRONIDAZOLE 500 MG/1
500 TABLET ORAL ONCE
Status: COMPLETED | OUTPATIENT
Start: 2025-05-06 | End: 2025-05-06

## 2025-05-06 RX ADMIN — METRONIDAZOLE 500 MG: 500 TABLET ORAL at 00:29

## 2025-05-06 NOTE — ED PROVIDER NOTES
EMERGENCY DEPARTMENT ENCOUNTER     [facility]     Pt Name: Jayda Zamora   MRN: [id number]   Birthdate 1980   Date of evaluation: 5/5/2025   Provider: Stephanie Bond MD   PCP: Rich Keenan MD   Note Started: 6:31 AM EDT 5/6/25     CHIEF COMPLAINT:     Chief Complaint   Patient presents with    Exposure to STD        HISTORY OF PRESENT ILLNESS:  Adult female who comes in with vaginal itching and watery discharge.  She denies rashes or lesions.  She describes the discharge as malodorous.  She is concerned that she may have an STI as she has a new sexual partner she also states that she has had BV frequently in the past.  No fevers or chills.  No urinary symptoms.    PHYSICAL EXAM:    ED Triage Vitals [05/05/25 2247]   BP Systolic BP Percentile Diastolic BP Percentile Temp Temp Source Pulse Respirations SpO2   (!) 149/90 -- -- 98 °F (36.7 °C) Oral 91 16 100 %      Height Weight - Scale         1.575 m (5' 2\") 73.5 kg (162 lb)              Physical Exam  Vitals and nursing note reviewed.   Constitutional:       Appearance: Normal appearance. She is well-developed. She is not ill-appearing.   HENT:      Head: Normocephalic and atraumatic.      Right Ear: External ear normal.      Left Ear: External ear normal.      Nose: Nose normal.   Eyes:      General: No scleral icterus.        Right eye: No discharge.         Left eye: No discharge.      Conjunctiva/sclera: Conjunctivae normal.   Cardiovascular:      Rate and Rhythm: Normal rate.   Pulmonary:      Effort: Pulmonary effort is normal. No respiratory distress.   Abdominal:      Tenderness: There is no right CVA tenderness or left CVA tenderness.      Comments: Mild suprapubic tenderness   Musculoskeletal:      Cervical back: Neck supple.   Skin:     Coloration: Skin is not pale.   Neurological:      Mental Status: She is alert.   Psychiatric:         Mood and Affect: Mood normal.         Behavior: Behavior normal.

## 2025-05-07 ENCOUNTER — RESULTS FOLLOW-UP (OUTPATIENT)
Dept: EMERGENCY DEPT | Age: 45
End: 2025-05-07

## 2025-05-07 LAB
C TRACH DNA CVX QL NAA+PROBE: NEGATIVE
N GONORRHOEA DNA CERV MUCUS QL NAA+PROBE: NEGATIVE